# Patient Record
Sex: MALE | Race: BLACK OR AFRICAN AMERICAN | Employment: UNEMPLOYED | URBAN - METROPOLITAN AREA
[De-identification: names, ages, dates, MRNs, and addresses within clinical notes are randomized per-mention and may not be internally consistent; named-entity substitution may affect disease eponyms.]

---

## 2019-07-18 ENCOUNTER — HOSPITAL ENCOUNTER (EMERGENCY)
Facility: CLINIC | Age: 54
Discharge: HOME | End: 2019-07-18
Attending: EMERGENCY MEDICINE | Admitting: EMERGENCY MEDICINE
Payer: COMMERCIAL

## 2019-07-18 VITALS
OXYGEN SATURATION: 98 % | RESPIRATION RATE: 18 BRPM | WEIGHT: 185 LBS | BODY MASS INDEX: 26.48 KG/M2 | SYSTOLIC BLOOD PRESSURE: 149 MMHG | TEMPERATURE: 98 F | HEART RATE: 93 BPM | HEIGHT: 70 IN | DIASTOLIC BLOOD PRESSURE: 82 MMHG

## 2019-07-18 DIAGNOSIS — E87.6 HYPOKALEMIA: ICD-10-CM

## 2019-07-18 DIAGNOSIS — R11.2 NON-INTRACTABLE VOMITING WITH NAUSEA, UNSPECIFIED VOMITING TYPE: ICD-10-CM

## 2019-07-18 LAB
ANION GAP SERPL CALCULATED.3IONS-SCNC: 15 MMOL/L (ref 3–14)
BASOPHILS # BLD AUTO: 0.1 10E9/L (ref 0–0.2)
BASOPHILS NFR BLD AUTO: 0.9 %
BUN SERPL-MCNC: 8 MG/DL (ref 7–30)
CALCIUM SERPL-MCNC: 9.2 MG/DL (ref 8.5–10.1)
CHLORIDE SERPL-SCNC: 99 MMOL/L (ref 94–109)
CO2 SERPL-SCNC: 22 MMOL/L (ref 20–32)
CREAT SERPL-MCNC: 0.46 MG/DL (ref 0.66–1.25)
DIFFERENTIAL METHOD BLD: ABNORMAL
EOSINOPHIL # BLD AUTO: 0.3 10E9/L (ref 0–0.7)
EOSINOPHIL NFR BLD AUTO: 5.6 %
ERYTHROCYTE [DISTWIDTH] IN BLOOD BY AUTOMATED COUNT: 12.8 % (ref 10–15)
GFR SERPL CREATININE-BSD FRML MDRD: >90 ML/MIN/{1.73_M2}
GLUCOSE SERPL-MCNC: 115 MG/DL (ref 70–99)
HCT VFR BLD AUTO: 43 % (ref 40–53)
HGB BLD-MCNC: 14.1 G/DL (ref 13.3–17.7)
IMM GRANULOCYTES # BLD: 0 10E9/L (ref 0–0.4)
IMM GRANULOCYTES NFR BLD: 0.4 %
LYMPHOCYTES # BLD AUTO: 0.9 10E9/L (ref 0.8–5.3)
LYMPHOCYTES NFR BLD AUTO: 16.3 %
MCH RBC QN AUTO: 33.7 PG (ref 26.5–33)
MCHC RBC AUTO-ENTMCNC: 32.8 G/DL (ref 31.5–36.5)
MCV RBC AUTO: 103 FL (ref 78–100)
MONOCYTES # BLD AUTO: 0.5 10E9/L (ref 0–1.3)
MONOCYTES NFR BLD AUTO: 8.7 %
NEUTROPHILS # BLD AUTO: 3.7 10E9/L (ref 1.6–8.3)
NEUTROPHILS NFR BLD AUTO: 68.1 %
NRBC # BLD AUTO: 0 10*3/UL
NRBC BLD AUTO-RTO: 0 /100
PLATELET # BLD AUTO: 131 10E9/L (ref 150–450)
POTASSIUM SERPL-SCNC: 3 MMOL/L (ref 3.4–5.3)
RBC # BLD AUTO: 4.18 10E12/L (ref 4.4–5.9)
SODIUM SERPL-SCNC: 135 MMOL/L (ref 133–144)
WBC # BLD AUTO: 5.4 10E9/L (ref 4–11)

## 2019-07-18 PROCEDURE — 99284 EMERGENCY DEPT VISIT MOD MDM: CPT | Mod: Z6 | Performed by: EMERGENCY MEDICINE

## 2019-07-18 PROCEDURE — 80048 BASIC METABOLIC PNL TOTAL CA: CPT | Performed by: EMERGENCY MEDICINE

## 2019-07-18 PROCEDURE — 96365 THER/PROPH/DIAG IV INF INIT: CPT | Performed by: EMERGENCY MEDICINE

## 2019-07-18 PROCEDURE — 25000131 ZZH RX MED GY IP 250 OP 636 PS 637: Performed by: EMERGENCY MEDICINE

## 2019-07-18 PROCEDURE — 96361 HYDRATE IV INFUSION ADD-ON: CPT | Performed by: EMERGENCY MEDICINE

## 2019-07-18 PROCEDURE — 25000132 ZZH RX MED GY IP 250 OP 250 PS 637: Performed by: EMERGENCY MEDICINE

## 2019-07-18 PROCEDURE — 99284 EMERGENCY DEPT VISIT MOD MDM: CPT | Mod: 25 | Performed by: EMERGENCY MEDICINE

## 2019-07-18 PROCEDURE — 96372 THER/PROPH/DIAG INJ SC/IM: CPT | Mod: 59 | Performed by: EMERGENCY MEDICINE

## 2019-07-18 PROCEDURE — 25000128 H RX IP 250 OP 636: Performed by: EMERGENCY MEDICINE

## 2019-07-18 PROCEDURE — 96366 THER/PROPH/DIAG IV INF ADDON: CPT | Performed by: EMERGENCY MEDICINE

## 2019-07-18 PROCEDURE — 85025 COMPLETE CBC W/AUTO DIFF WBC: CPT | Performed by: EMERGENCY MEDICINE

## 2019-07-18 RX ORDER — ONDANSETRON 4 MG/1
4 TABLET, ORALLY DISINTEGRATING ORAL EVERY 8 HOURS PRN
Qty: 10 TABLET | Refills: 0 | Status: ON HOLD | OUTPATIENT
Start: 2019-07-18 | End: 2019-10-25

## 2019-07-18 RX ORDER — ACETAMINOPHEN 325 MG/1
975 TABLET ORAL ONCE
Status: COMPLETED | OUTPATIENT
Start: 2019-07-18 | End: 2019-07-18

## 2019-07-18 RX ORDER — ONDANSETRON 4 MG/1
4 TABLET, ORALLY DISINTEGRATING ORAL ONCE
Status: COMPLETED | OUTPATIENT
Start: 2019-07-18 | End: 2019-07-18

## 2019-07-18 RX ORDER — POTASSIUM CL/LIDO/0.9 % NACL 10MEQ/0.1L
10 INTRAVENOUS SOLUTION, PIGGYBACK (ML) INTRAVENOUS ONCE
Status: COMPLETED | OUTPATIENT
Start: 2019-07-18 | End: 2019-07-18

## 2019-07-18 RX ORDER — OLANZAPINE 10 MG/2ML
2.5 INJECTION, POWDER, FOR SOLUTION INTRAMUSCULAR ONCE
Status: COMPLETED | OUTPATIENT
Start: 2019-07-18 | End: 2019-07-18

## 2019-07-18 RX ORDER — ONDANSETRON 4 MG/1
4 TABLET, ORALLY DISINTEGRATING ORAL ONCE
Status: DISCONTINUED | OUTPATIENT
Start: 2019-07-18 | End: 2019-07-18

## 2019-07-18 RX ORDER — POTASSIUM CHLORIDE 1.5 G/1.58G
60 POWDER, FOR SOLUTION ORAL ONCE
Status: COMPLETED | OUTPATIENT
Start: 2019-07-18 | End: 2019-07-18

## 2019-07-18 RX ORDER — SODIUM CHLORIDE 9 MG/ML
1000 INJECTION, SOLUTION INTRAVENOUS CONTINUOUS
Status: DISCONTINUED | OUTPATIENT
Start: 2019-07-18 | End: 2019-07-18 | Stop reason: HOSPADM

## 2019-07-18 RX ADMIN — SODIUM CHLORIDE 1000 ML: 900 INJECTION, SOLUTION INTRAVENOUS at 06:50

## 2019-07-18 RX ADMIN — SODIUM CHLORIDE 1000 ML: 9 INJECTION, SOLUTION INTRAVENOUS at 05:11

## 2019-07-18 RX ADMIN — ONDANSETRON 4 MG: 4 TABLET, ORALLY DISINTEGRATING ORAL at 04:52

## 2019-07-18 RX ADMIN — ACETAMINOPHEN 975 MG: 325 TABLET, FILM COATED ORAL at 09:16

## 2019-07-18 RX ADMIN — OLANZAPINE 2.5 MG: 10 INJECTION, POWDER, FOR SOLUTION INTRAMUSCULAR at 06:10

## 2019-07-18 RX ADMIN — POTASSIUM CHLORIDE 60 MEQ: 1.5 POWDER, FOR SOLUTION ORAL at 06:02

## 2019-07-18 RX ADMIN — Medication 10 MEQ: at 06:13

## 2019-07-18 ASSESSMENT — MIFFLIN-ST. JEOR: SCORE: 1690.4

## 2019-07-18 NOTE — DISCHARGE INSTRUCTIONS
"Please make an appointment to follow up with Your Primary Care Provider in 1-2 days unless symptoms completely resolve.  If your symptoms worsen despite taking the prescribed medications please come back to the emergency department.        *VOMITING [6yr-Adult]  Vomiting is a common symptom that may be due to different causes. These include gastroenteritis (\"stomach-flu\"), food poisoning and gastritis. There are other more serious causes of vomiting which may be hard to diagnose early in the illness. Therefore, it is important to watch for the warning signs listed below.  The main danger from repeated vomiting is \"dehydration\". This is due to excess loss of water and minerals from the body. When this occurs, body fluids must be replaced.  HOME CARE:  1) If symptoms are severe, rest at home for the next 24 hours.  2) You may use acetaminophen (Tylenol) 650-1000 mg every 6 hours to control fever, unless another medicine was prescribed. [ NOTE : If you have chronic liver disease, talk with your doctor before using acetaminophen.] (Aspirin should never be used in anyone under 18 years of age who is ill with a fever. It may cause severe liver damage.)  3) Avoid tobacco and alcohol use, which may worsen your symptoms.  4) If medicines for vomiting were prescribed, take as directed.   DURING THE FIRST 12-24 HOURS follow the diet below. Try to take frequent small sips even if you vomit occasionally:  FRUIT JUICES: Apple, grape juice, clear fruit drinks, electrolyte replacement and sports drinks.  BEVERAGES: Sport drinks such as Gatorade, soft drinks without caffeine; mineral water (plain or flavored), decaffeinated tea and coffee.  SOUPS: Clear broth, consommé and bouillon  DESSERTS: Plain gelatin (Jell-O), popsicles and fruit juice bars.  DURING THE NEXT 24 HOURS you may add the following to the above:  Hot cereal, plain toast, bread, rolls, crackers  Plain noodles, rice, mashed potatoes, chicken noodle or rice " soup  Unsweetened canned fruit (avoid pineapple), bananas  Avoid dairy products   Limit caffeine and chocolate. No spices or seasonings except salt.   DURING THE NEXT 24 HOURS  Gradually resume a normal diet, as you feel better and your symptoms lessen.  FOLLOW UP with your doctor as advised if you are not improving over the next 2-3 days.  GET PROMPT MEDICAL ATTENTION if any of the following occur:  -- Constant abdominal pain that stays in the same spot or gets worse  -- Continued vomiting (unable to keep liquids down) for 24 hours  -- Frequent diarrhea (more than 5 times a day); blood (red or black color) in diarrhea  -- No urine output for 12 hours or extreme thirst  -- Weakness, dizziness or fainting  -- Unusually drowsy or confused  -- Fever over 101.0  F (38.3  C) for more than 3 days  -- Yellow color of the eyes or skin    6883-1261 [employer], [address] [place] [address], [place] [address]. All rights reserved. This information is not intended as a substitute for professional medical care. Always follow your healthcare professional's instructions.

## 2019-07-18 NOTE — ED NOTES
Patient signed out to me by my partner.  Patient presented to the emergency department with nausea/vomiting.  Patient found to have hypokalemia and his potassium was repleted with intravenous and oral potassium solutions.  Patient's nausea and vomiting resolved and he asked to be discharged.  Patient denies any pain at discharge.  Gait is normal discharge.     Brooke Oviedo MD  07/18/19 5742

## 2019-07-18 NOTE — ED PROVIDER NOTES
"  History     Chief Complaint   Patient presents with     Nausea & Vomiting     HPI  Bridgette eMna is a 53 year old male history of diabetes, hypertriglyceridemia induced pancreatitis, hepatic steatosis, history of HCV and HIV exposure, asthma, substance abuse, among other medical problems.  Patient was a friend who brought another patient to the emergency department and later was triaged for nausea and vomiting.  Apparently, he was in contact with others who had similar symptoms.  Yesterday he had been feeling completely well towards the evening began to feel nauseous and early this morning had several episodes of vomiting associated with chills, feeling cold, shakes and malaise.    No associated chest pain or shortness of breath.  Endorses some bony aches.  No rashes.  No recent trauma.  Again was feeling completely well yesterday morning.    I have reviewed the Medications, Allergies, Past Medical and Surgical History, and Social History in the Epic system.    Review of Systems   14 point review of symptoms was performed and is negative except as noted above.     Physical Exam   BP: (!) 139/96  Pulse: 120  Temp: 98  F (36.7  C)  Resp: 16  Height: 177.8 cm (5' 10\")  Weight: 83.9 kg (185 lb)  SpO2: 95 %      Physical Exam     GEN: Nauseated   HEENT: The head is normocephalic and atraumatic. Pupils are equal round and reactive to light. Extraocular motions are intact. There is no facial swelling. The neck is nontender and supple.   CV: Regular rate and rhythm without murmurs rubs or gallops. 2+ radial pulses bilaterally.  PULM: Clear to auscultation bilaterally.  ABD: Soft, nontender, nondistended.   EXT: Full range of motion.  No edema.  NEURO: Cranial nerves II through XII are intact and symmetric. Bilateral upper and lower extremities grossly show full range of motion without any focal deficits.   PSYCH: Calm and cooperative, interactive.     ED Course        Procedures               Labs Ordered and Resulted " from Time of ED Arrival Up to the Time of Departure from the ED   CBC WITH PLATELETS DIFFERENTIAL - Abnormal; Notable for the following components:       Result Value    RBC Count 4.18 (*)      (*)     MCH 33.7 (*)     Platelet Count 131 (*)     All other components within normal limits   BASIC METABOLIC PANEL - Abnormal; Notable for the following components:    Potassium 3.0 (*)     Anion Gap 15 (*)     Glucose 115 (*)     Creatinine 0.46 (*)     All other components within normal limits            Assessments & Plan (with Medical Decision Making)   53-year-old male with acute nausea and vomiting and general malaise, Reiger's.  Differential diagnosis is broad including bacteremia, viremia, acute viral syndrome, pneumonia, urinary tract infection, alcohol withdrawal, among other causes.    Clinically the patient appears nauseous.  His exam is otherwise unrevealing.  He has an ill contact with very similar symptoms.  Labs are overall reassuring with exception of mild hypokalemia which was orally and IV replaced.  Patient was given Zofran and olanzapine as well as IV fluids with significant improvement in symptoms.  Patient was signed out to morning attending for reevaluation.  Anticipate she will improve and will be appropriate for discharge following oral challenge.              I have reviewed the nursing notes.    I have reviewed the findings, diagnosis, plan and need for follow up with the patient.       Medication List      There are no discharge medications for this visit.         Final diagnoses:   Non-intractable vomiting with nausea, unspecified vomiting type       7/18/2019   [facility], [place], EMERGENCY DEPARTMENT     Gera Del Castillo MD  07/18/19 0765

## 2019-07-18 NOTE — ED TRIAGE NOTES
Patient presents with c/o nausea and vomiting for the past day. Patient denies abdominal pain and diarrhea. Also reports chills and hot flashes. Denies daily drinking, last drink yesterday morning.

## 2019-07-18 NOTE — ED AVS SNAPSHOT
[facility], [place], Emergency Department  [address] [place] [id number]  Phone:  [phone]                                    Bridgette Mena   MRN: [id number]    Department:  [facility], Emergency Department   Date of Visit:  7/18/2019           After Visit Summary Signature Page    I have received my discharge instructions, and my questions have been answered. I have discussed any challenges I see with this plan with the nurse or doctor.    ..........................................................................................................................................  Patient/Patient Representative Signature      ..........................................................................................................................................  Patient Representative Print Name and Relationship to Patient    ..................................................               ................................................  Date                                   Time    ..........................................................................................................................................  Reviewed by Signature/Title    ...................................................              ..............................................  Date                                               Time          22EPIC Rev 08/18

## 2019-10-20 ENCOUNTER — APPOINTMENT (OUTPATIENT)
Dept: CT IMAGING | Facility: CLINIC | Age: 54
End: 2019-10-20
Attending: EMERGENCY MEDICINE
Payer: COMMERCIAL

## 2019-10-20 ENCOUNTER — APPOINTMENT (OUTPATIENT)
Dept: GENERAL RADIOLOGY | Facility: CLINIC | Age: 54
End: 2019-10-20
Attending: EMERGENCY MEDICINE
Payer: COMMERCIAL

## 2019-10-20 ENCOUNTER — HOSPITAL ENCOUNTER (INPATIENT)
Facility: CLINIC | Age: 54
LOS: 5 days | Discharge: HOME | End: 2019-10-25
Attending: EMERGENCY MEDICINE | Admitting: INTERNAL MEDICINE
Payer: COMMERCIAL

## 2019-10-20 DIAGNOSIS — K92.2 GASTROINTESTINAL HEMORRHAGE, UNSPECIFIED GASTROINTESTINAL HEMORRHAGE TYPE: ICD-10-CM

## 2019-10-20 DIAGNOSIS — J18.9 PNEUMONIA OF RIGHT MIDDLE LOBE DUE TO INFECTIOUS ORGANISM: ICD-10-CM

## 2019-10-20 DIAGNOSIS — A09 DIARRHEA OF INFECTIOUS ORIGIN: Primary | ICD-10-CM

## 2019-10-20 PROBLEM — R19.7 DIARRHEA: Status: ACTIVE | Noted: 2019-10-20

## 2019-10-20 LAB
ABO + RH BLD: NORMAL
ABO + RH BLD: NORMAL
ALBUMIN SERPL-MCNC: 2.7 G/DL (ref 3.4–5)
ALP SERPL-CCNC: 448 U/L (ref 40–150)
ALT SERPL W P-5'-P-CCNC: 45 U/L (ref 0–70)
ANION GAP SERPL CALCULATED.3IONS-SCNC: 17 MMOL/L (ref 3–14)
AST SERPL W P-5'-P-CCNC: 185 U/L (ref 0–45)
BASOPHILS # BLD AUTO: 0 10E9/L (ref 0–0.2)
BASOPHILS NFR BLD AUTO: 0.3 %
BILIRUB SERPL-MCNC: 3.8 MG/DL (ref 0.2–1.3)
BLD GP AB SCN SERPL QL: NORMAL
BLOOD BANK CMNT PATIENT-IMP: NORMAL
BUN SERPL-MCNC: 6 MG/DL (ref 7–30)
CALCIUM SERPL-MCNC: 8.2 MG/DL (ref 8.5–10.1)
CHLORIDE SERPL-SCNC: 98 MMOL/L (ref 94–109)
CO2 BLDCOV-SCNC: 22 MMOL/L (ref 21–28)
CO2 SERPL-SCNC: 21 MMOL/L (ref 20–32)
CREAT SERPL-MCNC: 0.37 MG/DL (ref 0.66–1.25)
DIFFERENTIAL METHOD BLD: ABNORMAL
EOSINOPHIL # BLD AUTO: 0.1 10E9/L (ref 0–0.7)
EOSINOPHIL NFR BLD AUTO: 0.8 %
ERYTHROCYTE [DISTWIDTH] IN BLOOD BY AUTOMATED COUNT: 14 % (ref 10–15)
ETHANOL SERPL-MCNC: 0.24 G/DL
GFR SERPL CREATININE-BSD FRML MDRD: >90 ML/MIN/{1.73_M2}
GLUCOSE BLDC GLUCOMTR-MCNC: 149 MG/DL (ref 70–99)
GLUCOSE BLDC GLUCOMTR-MCNC: 42 MG/DL (ref 70–99)
GLUCOSE BLDC GLUCOMTR-MCNC: 72 MG/DL (ref 70–99)
GLUCOSE SERPL-MCNC: 69 MG/DL (ref 70–99)
HCT VFR BLD AUTO: 34.2 % (ref 40–53)
HGB BLD-MCNC: 10.3 G/DL (ref 13.3–17.7)
HGB BLD-MCNC: 11.2 G/DL (ref 13.3–17.7)
IMM GRANULOCYTES # BLD: 0 10E9/L (ref 0–0.4)
IMM GRANULOCYTES NFR BLD: 0.4 %
INR PPP: 1.37 (ref 0.86–1.14)
LACTATE BLD-SCNC: 1.8 MMOL/L (ref 0.7–2.1)
LIPASE SERPL-CCNC: 87 U/L (ref 73–393)
LYMPHOCYTES # BLD AUTO: 1.1 10E9/L (ref 0.8–5.3)
LYMPHOCYTES NFR BLD AUTO: 12.3 %
MAGNESIUM SERPL-MCNC: 1.3 MG/DL (ref 1.6–2.3)
MCH RBC QN AUTO: 35.6 PG (ref 26.5–33)
MCHC RBC AUTO-ENTMCNC: 32.7 G/DL (ref 31.5–36.5)
MCV RBC AUTO: 109 FL (ref 78–100)
MONOCYTES # BLD AUTO: 0.7 10E9/L (ref 0–1.3)
MONOCYTES NFR BLD AUTO: 7.4 %
NEUTROPHILS # BLD AUTO: 7.3 10E9/L (ref 1.6–8.3)
NEUTROPHILS NFR BLD AUTO: 78.8 %
NRBC # BLD AUTO: 0 10*3/UL
NRBC BLD AUTO-RTO: 0 /100
PCO2 BLDV: 33 MM HG (ref 40–50)
PH BLDV: 7.43 PH (ref 7.32–7.43)
PHOSPHATE SERPL-MCNC: 3.3 MG/DL (ref 2.5–4.5)
PLATELET # BLD AUTO: 124 10E9/L (ref 150–450)
PO2 BLDV: 52 MM HG (ref 25–47)
POTASSIUM SERPL-SCNC: 3.2 MMOL/L (ref 3.4–5.3)
PROT SERPL-MCNC: 8.7 G/DL (ref 6.8–8.8)
RBC # BLD AUTO: 3.15 10E12/L (ref 4.4–5.9)
SAO2 % BLDV FROM PO2: 88 %
SODIUM SERPL-SCNC: 135 MMOL/L (ref 133–144)
SPECIMEN EXP DATE BLD: NORMAL
WBC # BLD AUTO: 9.2 10E9/L (ref 4–11)

## 2019-10-20 PROCEDURE — 25000128 H RX IP 250 OP 636: Performed by: NURSE PRACTITIONER

## 2019-10-20 PROCEDURE — 96365 THER/PROPH/DIAG IV INF INIT: CPT | Mod: 59 | Performed by: EMERGENCY MEDICINE

## 2019-10-20 PROCEDURE — 83735 ASSAY OF MAGNESIUM: CPT | Performed by: EMERGENCY MEDICINE

## 2019-10-20 PROCEDURE — 87040 BLOOD CULTURE FOR BACTERIA: CPT | Performed by: EMERGENCY MEDICINE

## 2019-10-20 PROCEDURE — 87493 C DIFF AMPLIFIED PROBE: CPT | Performed by: NURSE PRACTITIONER

## 2019-10-20 PROCEDURE — 99207 ZZC APP CREDIT; MD BILLING SHARED VISIT: CPT | Performed by: NURSE PRACTITIONER

## 2019-10-20 PROCEDURE — 80053 COMPREHEN METABOLIC PANEL: CPT | Performed by: EMERGENCY MEDICINE

## 2019-10-20 PROCEDURE — 81001 URINALYSIS AUTO W/SCOPE: CPT | Performed by: NURSE PRACTITIONER

## 2019-10-20 PROCEDURE — 25000132 ZZH RX MED GY IP 250 OP 250 PS 637: Performed by: EMERGENCY MEDICINE

## 2019-10-20 PROCEDURE — 84100 ASSAY OF PHOSPHORUS: CPT | Performed by: EMERGENCY MEDICINE

## 2019-10-20 PROCEDURE — 86900 BLOOD TYPING SEROLOGIC ABO: CPT | Performed by: EMERGENCY MEDICINE

## 2019-10-20 PROCEDURE — 99223 1ST HOSP IP/OBS HIGH 75: CPT | Mod: AI | Performed by: INTERNAL MEDICINE

## 2019-10-20 PROCEDURE — 96376 TX/PRO/DX INJ SAME DRUG ADON: CPT | Performed by: EMERGENCY MEDICINE

## 2019-10-20 PROCEDURE — 25000128 H RX IP 250 OP 636: Performed by: STUDENT IN AN ORGANIZED HEALTH CARE EDUCATION/TRAINING PROGRAM

## 2019-10-20 PROCEDURE — 96375 TX/PRO/DX INJ NEW DRUG ADDON: CPT | Performed by: EMERGENCY MEDICINE

## 2019-10-20 PROCEDURE — 96368 THER/DIAG CONCURRENT INF: CPT | Performed by: EMERGENCY MEDICINE

## 2019-10-20 PROCEDURE — 99285 EMERGENCY DEPT VISIT HI MDM: CPT | Mod: 25 | Performed by: EMERGENCY MEDICINE

## 2019-10-20 PROCEDURE — 25000132 ZZH RX MED GY IP 250 OP 250 PS 637: Performed by: NURSE PRACTITIONER

## 2019-10-20 PROCEDURE — 25000125 ZZHC RX 250: Performed by: EMERGENCY MEDICINE

## 2019-10-20 PROCEDURE — 70450 CT HEAD/BRAIN W/O DYE: CPT

## 2019-10-20 PROCEDURE — 25000128 H RX IP 250 OP 636: Performed by: EMERGENCY MEDICINE

## 2019-10-20 PROCEDURE — 83605 ASSAY OF LACTIC ACID: CPT

## 2019-10-20 PROCEDURE — 80320 DRUG SCREEN QUANTALCOHOLS: CPT | Performed by: EMERGENCY MEDICINE

## 2019-10-20 PROCEDURE — C9113 INJ PANTOPRAZOLE SODIUM, VIA: HCPCS | Performed by: NURSE PRACTITIONER

## 2019-10-20 PROCEDURE — 83690 ASSAY OF LIPASE: CPT | Performed by: EMERGENCY MEDICINE

## 2019-10-20 PROCEDURE — 25800030 ZZH RX IP 258 OP 636: Performed by: EMERGENCY MEDICINE

## 2019-10-20 PROCEDURE — 25800025 ZZH RX 258: Performed by: NURSE PRACTITIONER

## 2019-10-20 PROCEDURE — 12000001 ZZH R&B MED SURG/OB UMMC

## 2019-10-20 PROCEDURE — C9113 INJ PANTOPRAZOLE SODIUM, VIA: HCPCS | Performed by: EMERGENCY MEDICINE

## 2019-10-20 PROCEDURE — 74177 CT ABD & PELVIS W/CONTRAST: CPT

## 2019-10-20 PROCEDURE — 87329 GIARDIA AG IA: CPT | Performed by: NURSE PRACTITIONER

## 2019-10-20 PROCEDURE — 85018 HEMOGLOBIN: CPT | Performed by: NURSE PRACTITIONER

## 2019-10-20 PROCEDURE — 96366 THER/PROPH/DIAG IV INF ADDON: CPT | Performed by: EMERGENCY MEDICINE

## 2019-10-20 PROCEDURE — 93010 ELECTROCARDIOGRAM REPORT: CPT | Mod: Z6 | Performed by: EMERGENCY MEDICINE

## 2019-10-20 PROCEDURE — 85025 COMPLETE CBC W/AUTO DIFF WBC: CPT | Performed by: EMERGENCY MEDICINE

## 2019-10-20 PROCEDURE — 80320 DRUG SCREEN QUANTALCOHOLS: CPT | Performed by: NURSE PRACTITIONER

## 2019-10-20 PROCEDURE — 80307 DRUG TEST PRSMV CHEM ANLYZR: CPT | Performed by: NURSE PRACTITIONER

## 2019-10-20 PROCEDURE — 82803 BLOOD GASES ANY COMBINATION: CPT

## 2019-10-20 PROCEDURE — 86850 RBC ANTIBODY SCREEN: CPT | Performed by: EMERGENCY MEDICINE

## 2019-10-20 PROCEDURE — 87506 IADNA-DNA/RNA PROBE TQ 6-11: CPT | Performed by: NURSE PRACTITIONER

## 2019-10-20 PROCEDURE — 71046 X-RAY EXAM CHEST 2 VIEWS: CPT

## 2019-10-20 PROCEDURE — 87328 CRYPTOSPORIDIUM AG IA: CPT | Performed by: NURSE PRACTITIONER

## 2019-10-20 PROCEDURE — 00000146 ZZHCL STATISTIC GLUCOSE BY METER IP

## 2019-10-20 PROCEDURE — 25800030 ZZH RX IP 258 OP 636: Performed by: NURSE PRACTITIONER

## 2019-10-20 PROCEDURE — 86901 BLOOD TYPING SEROLOGIC RH(D): CPT | Performed by: EMERGENCY MEDICINE

## 2019-10-20 PROCEDURE — 85610 PROTHROMBIN TIME: CPT | Performed by: EMERGENCY MEDICINE

## 2019-10-20 PROCEDURE — 93005 ELECTROCARDIOGRAM TRACING: CPT | Performed by: EMERGENCY MEDICINE

## 2019-10-20 PROCEDURE — 87522 HEPATITIS C REVRS TRNSCRPJ: CPT | Performed by: EMERGENCY MEDICINE

## 2019-10-20 RX ORDER — MIRTAZAPINE 30 MG/1
30 TABLET, FILM COATED ORAL
COMMUNITY
Start: 2019-10-19

## 2019-10-20 RX ORDER — GABAPENTIN 300 MG/1
300 CAPSULE ORAL
COMMUNITY
Start: 2019-09-18

## 2019-10-20 RX ORDER — ALBUTEROL SULFATE 90 UG/1
1-2 AEROSOL, METERED RESPIRATORY (INHALATION) EVERY 4 HOURS PRN
Status: DISCONTINUED | OUTPATIENT
Start: 2019-10-20 | End: 2019-10-25

## 2019-10-20 RX ORDER — FOLIC ACID 1 MG/1
1 TABLET ORAL
COMMUNITY
Start: 2019-06-24

## 2019-10-20 RX ORDER — PROCHLORPERAZINE 25 MG
25 SUPPOSITORY, RECTAL RECTAL EVERY 12 HOURS PRN
Status: DISCONTINUED | OUTPATIENT
Start: 2019-10-20 | End: 2019-10-25

## 2019-10-20 RX ORDER — IOPAMIDOL 755 MG/ML
100 INJECTION, SOLUTION INTRAVASCULAR ONCE
Status: COMPLETED | OUTPATIENT
Start: 2019-10-20 | End: 2019-10-20

## 2019-10-20 RX ORDER — OXYCODONE HYDROCHLORIDE 5 MG/1
5 TABLET ORAL EVERY 4 HOURS PRN
Status: DISCONTINUED | OUTPATIENT
Start: 2019-10-20 | End: 2019-10-25

## 2019-10-20 RX ORDER — LORATADINE 10 MG/1
10 TABLET ORAL
COMMUNITY
Start: 2019-10-19

## 2019-10-20 RX ORDER — NALOXONE HYDROCHLORIDE 0.4 MG/ML
.1-.4 INJECTION, SOLUTION INTRAMUSCULAR; INTRAVENOUS; SUBCUTANEOUS
Status: DISCONTINUED | OUTPATIENT
Start: 2019-10-20 | End: 2019-10-25

## 2019-10-20 RX ORDER — FLUTICASONE PROPIONATE 50 MCG
1 SPRAY, SUSPENSION (ML) NASAL
COMMUNITY
Start: 2016-02-10

## 2019-10-20 RX ORDER — FOLIC ACID 1 MG/1
1 TABLET ORAL DAILY
Status: DISCONTINUED | OUTPATIENT
Start: 2019-10-21 | End: 2019-10-20

## 2019-10-20 RX ORDER — MAGNESIUM SULFATE HEPTAHYDRATE 40 MG/ML
4 INJECTION, SOLUTION INTRAVENOUS EVERY 4 HOURS PRN
Status: DISCONTINUED | OUTPATIENT
Start: 2019-10-20 | End: 2019-10-25

## 2019-10-20 RX ORDER — ONDANSETRON 2 MG/ML
4 INJECTION INTRAMUSCULAR; INTRAVENOUS EVERY 6 HOURS PRN
Status: DISCONTINUED | OUTPATIENT
Start: 2019-10-20 | End: 2019-10-25

## 2019-10-20 RX ORDER — CHLORDIAZEPOXIDE HYDROCHLORIDE 25 MG/1
25 CAPSULE, GELATIN COATED ORAL 3 TIMES DAILY
Status: DISCONTINUED | OUTPATIENT
Start: 2019-10-20 | End: 2019-10-20

## 2019-10-20 RX ORDER — DIAZEPAM 5 MG
10 TABLET ORAL EVERY 30 MIN PRN
Status: DISCONTINUED | OUTPATIENT
Start: 2019-10-20 | End: 2019-10-20 | Stop reason: CLARIF

## 2019-10-20 RX ORDER — LANOLIN ALCOHOL/MO/W.PET/CERES
100 CREAM (GRAM) TOPICAL DAILY
Status: DISCONTINUED | OUTPATIENT
Start: 2019-10-20 | End: 2019-10-20

## 2019-10-20 RX ORDER — ESCITALOPRAM OXALATE 5 MG/1
10 TABLET ORAL DAILY
Status: DISCONTINUED | OUTPATIENT
Start: 2019-10-21 | End: 2019-10-25

## 2019-10-20 RX ORDER — LANOLIN ALCOHOL/MO/W.PET/CERES
100 CREAM (GRAM) TOPICAL DAILY
Status: DISCONTINUED | OUTPATIENT
Start: 2019-10-21 | End: 2019-10-25 | Stop reason: HOSPADM

## 2019-10-20 RX ORDER — QUETIAPINE FUMARATE 300 MG/1
600 TABLET, FILM COATED ORAL AT BEDTIME
Status: DISCONTINUED | OUTPATIENT
Start: 2019-10-20 | End: 2019-10-25

## 2019-10-20 RX ORDER — QUETIAPINE FUMARATE 300 MG/1
600 TABLET, FILM COATED ORAL
COMMUNITY
Start: 2019-09-19

## 2019-10-20 RX ORDER — AMPICILLIN AND SULBACTAM 2; 1 G/1; G/1
3 INJECTION, POWDER, FOR SOLUTION INTRAMUSCULAR; INTRAVENOUS ONCE
Status: COMPLETED | OUTPATIENT
Start: 2019-10-20 | End: 2019-10-20

## 2019-10-20 RX ORDER — AMPICILLIN AND SULBACTAM 2; 1 G/1; G/1
3 INJECTION, POWDER, FOR SOLUTION INTRAMUSCULAR; INTRAVENOUS EVERY 6 HOURS
Status: DISCONTINUED | OUTPATIENT
Start: 2019-10-21 | End: 2019-10-21

## 2019-10-20 RX ORDER — MIRTAZAPINE 15 MG/1
30 TABLET, FILM COATED ORAL AT BEDTIME
Status: DISCONTINUED | OUTPATIENT
Start: 2019-10-20 | End: 2019-10-25

## 2019-10-20 RX ORDER — FOLIC ACID 1 MG/1
1 TABLET ORAL DAILY
Status: DISCONTINUED | OUTPATIENT
Start: 2019-10-20 | End: 2019-10-25

## 2019-10-20 RX ORDER — ONDANSETRON 4 MG/1
4 TABLET, ORALLY DISINTEGRATING ORAL EVERY 6 HOURS PRN
Status: DISCONTINUED | OUTPATIENT
Start: 2019-10-20 | End: 2019-10-25

## 2019-10-20 RX ORDER — MULTIPLE VITAMINS W/ MINERALS TAB 9MG-400MCG
1 TAB ORAL DAILY
Status: DISCONTINUED | OUTPATIENT
Start: 2019-10-20 | End: 2019-10-25

## 2019-10-20 RX ORDER — OCTREOTIDE ACETATE 50 UG/ML
50 INJECTION, SOLUTION INTRAVENOUS; SUBCUTANEOUS ONCE
Status: COMPLETED | OUTPATIENT
Start: 2019-10-20 | End: 2019-10-20

## 2019-10-20 RX ORDER — LIDOCAINE 40 MG/G
CREAM TOPICAL
Status: DISCONTINUED | OUTPATIENT
Start: 2019-10-20 | End: 2019-10-25

## 2019-10-20 RX ORDER — FLUTICASONE PROPIONATE 110 UG/1
2 AEROSOL, METERED RESPIRATORY (INHALATION)
COMMUNITY
Start: 2019-07-19

## 2019-10-20 RX ORDER — GABAPENTIN 300 MG/1
300 CAPSULE ORAL AT BEDTIME
Status: DISCONTINUED | OUTPATIENT
Start: 2019-10-20 | End: 2019-10-25

## 2019-10-20 RX ORDER — ONDANSETRON 2 MG/ML
4 INJECTION INTRAMUSCULAR; INTRAVENOUS EVERY 30 MIN PRN
Status: DISCONTINUED | OUTPATIENT
Start: 2019-10-20 | End: 2019-10-20 | Stop reason: CLARIF

## 2019-10-20 RX ORDER — PANTOPRAZOLE SODIUM 40 MG/1
40 TABLET, DELAYED RELEASE ORAL
COMMUNITY
Start: 2019-06-24

## 2019-10-20 RX ORDER — POTASSIUM CHLORIDE 20MEQ/15ML
20 LIQUID (ML) ORAL ONCE
Status: COMPLETED | OUTPATIENT
Start: 2019-10-20 | End: 2019-10-20

## 2019-10-20 RX ORDER — ESCITALOPRAM OXALATE 10 MG/1
10 TABLET ORAL
COMMUNITY
Start: 2019-10-19

## 2019-10-20 RX ORDER — DEXTROSE MONOHYDRATE 25 G/50ML
25-50 INJECTION, SOLUTION INTRAVENOUS
Status: DISCONTINUED | OUTPATIENT
Start: 2019-10-20 | End: 2019-10-25

## 2019-10-20 RX ORDER — ERGOCALCIFEROL 1.25 MG/1
50000 CAPSULE, LIQUID FILLED ORAL
COMMUNITY
Start: 2019-10-19

## 2019-10-20 RX ORDER — PROCHLORPERAZINE MALEATE 10 MG
10 TABLET ORAL EVERY 6 HOURS PRN
Status: DISCONTINUED | OUTPATIENT
Start: 2019-10-20 | End: 2019-10-25

## 2019-10-20 RX ORDER — MULTIPLE VITAMINS W/ MINERALS TAB 9MG-400MCG
1 TAB ORAL DAILY
Status: DISCONTINUED | OUTPATIENT
Start: 2019-10-21 | End: 2019-10-20

## 2019-10-20 RX ORDER — CALCIUM CARBONATE 500 MG/1
500 TABLET, CHEWABLE ORAL
COMMUNITY
Start: 2019-05-10

## 2019-10-20 RX ORDER — NICOTINE POLACRILEX 4 MG
15-30 LOZENGE BUCCAL
Status: DISCONTINUED | OUTPATIENT
Start: 2019-10-20 | End: 2019-10-25

## 2019-10-20 RX ORDER — LORAZEPAM 1 MG/1
1-2 TABLET ORAL EVERY 30 MIN PRN
Status: DISCONTINUED | OUTPATIENT
Start: 2019-10-20 | End: 2019-10-25

## 2019-10-20 RX ORDER — ALBUTEROL SULFATE 90 UG/1
1-2 AEROSOL, METERED RESPIRATORY (INHALATION)
COMMUNITY
Start: 2016-05-12

## 2019-10-20 RX ADMIN — POTASSIUM CHLORIDE 20 MEQ: 20 SOLUTION ORAL at 17:35

## 2019-10-20 RX ADMIN — PANTOPRAZOLE SODIUM 8 MG/HR: 40 INJECTION, POWDER, FOR SOLUTION INTRAVENOUS at 20:55

## 2019-10-20 RX ADMIN — OCTREOTIDE ACETATE 50 MCG: 50 INJECTION, SOLUTION INTRAVENOUS; SUBCUTANEOUS at 20:10

## 2019-10-20 RX ADMIN — THIAMINE HCL TAB 100 MG 100 MG: 100 TAB at 17:33

## 2019-10-20 RX ADMIN — Medication 2 G: at 17:37

## 2019-10-20 RX ADMIN — GABAPENTIN 300 MG: 300 CAPSULE ORAL at 23:37

## 2019-10-20 RX ADMIN — OCTREOTIDE ACETATE 50 MCG/HR: 200 INJECTION, SOLUTION INTRAVENOUS; SUBCUTANEOUS at 20:12

## 2019-10-20 RX ADMIN — MULTIPLE VITAMINS W/ MINERALS TAB 1 TABLET: TAB at 17:33

## 2019-10-20 RX ADMIN — DEXTROSE 50 % IN WATER (D50W) INTRAVENOUS SYRINGE 25 ML: at 22:40

## 2019-10-20 RX ADMIN — LORAZEPAM 1 MG: 1 TABLET ORAL at 23:45

## 2019-10-20 RX ADMIN — SODIUM CHLORIDE 1000 ML: 9 INJECTION, SOLUTION INTRAVENOUS at 17:29

## 2019-10-20 RX ADMIN — IOPAMIDOL 100 ML: 755 INJECTION, SOLUTION INTRAVENOUS at 17:16

## 2019-10-20 RX ADMIN — PANTOPRAZOLE SODIUM 80 MG: 40 INJECTION, POWDER, LYOPHILIZED, FOR SOLUTION INTRAVENOUS at 20:24

## 2019-10-20 RX ADMIN — MIRTAZAPINE 30 MG: 15 TABLET, FILM COATED ORAL at 23:37

## 2019-10-20 RX ADMIN — AMPICILLIN SODIUM AND SULBACTAM SODIUM 3 G: 2; 1 INJECTION, POWDER, FOR SOLUTION INTRAMUSCULAR; INTRAVENOUS at 20:55

## 2019-10-20 RX ADMIN — ONDANSETRON 4 MG: 2 INJECTION INTRAMUSCULAR; INTRAVENOUS at 17:31

## 2019-10-20 RX ADMIN — FOLIC ACID 1 MG: 1 TABLET ORAL at 17:33

## 2019-10-20 ASSESSMENT — ENCOUNTER SYMPTOMS
FEVER: 0
DYSURIA: 0
CONFUSION: 0
FATIGUE: 1
DIZZINESS: 1
APPETITE CHANGE: 1
WEAKNESS: 1
VOMITING: 1
SHORTNESS OF BREATH: 1
ABDOMINAL PAIN: 1
ABDOMINAL DISTENTION: 1
CONSTIPATION: 0
DIARRHEA: 1
BLOOD IN STOOL: 1
COUGH: 1
HEADACHES: 0
NAUSEA: 1

## 2019-10-20 ASSESSMENT — MIFFLIN-ST. JEOR: SCORE: 1586.52

## 2019-10-20 NOTE — ED TRIAGE NOTES
Pt reports N/V/D  Progressively getting worse since 9/30. Pt originally thought his 'bad feeling' was from the many sweets he ate during his birthday celebration. Pt reports progressive weakness over the same time frame and falling from dizziness. Today Pt ate some rice with whole milk and a 'shot of Vodka.' Pt does have ETOH breath.

## 2019-10-20 NOTE — H&P
[facility], [place]    History and Physical - Hospitalist Service, Gold Night       Date of Admission:  10/20/2019    Assessment & Plan   Bridgette Mena is a 54 year old male admitted on 10/20/2019. He has a history of DM, asthma, seizures, polysubstance abuse and ongoing alcohol use (patient denies but BAL .24 on admission) who presents with bloody diarrhea and hepatitis.    1) Bloody diarrhea - Presents with loose, oily stools mised with dark red blood ongoing for two weeks.  ~10 episodes daily with no associated fevers.  Notes significant associated bloating.  He has a history of alcohol hepatitis and ongoing drinking (patient denies but BAL elevated and he has a history or alcohol withdrawal).  Question infectious diarrhea (less likely) vs GI bleed.  - C diff, SSCE, giardia  - Protonix, octreotide, GI consult for potential bleed.  NPO for possible scope  - Will check hgb tonight and then again in am.  - Given unclear extent of liver disease will treat with Unasyn to cover possible SBP (trace/small amount of ascites per my review of his CT scan) as well as concern for pneumonia on CT scan.    2) Alcoholic hepatitis, alcohol abuse - Patient with long history of polysubstance abuse who is not forthcoming regarding current alcohol use.  AST/ALT consistent with alcoholic hepatitis.  He had an extensive work up at [facility] on 5/10/2019 admission with negative anti smooth, PAULA < 1:80, negative Hep B, positive Hep C antibody but undetectable hep C via PCR.  - GI consulted as above  - CMP in am  - Given multiple prior episodes of alcohol withdrawal and an unclear alcohol history (patient reports last drink 10/17 but BAL 0.24) will start on librium for likely alcohol withdrawal.    3) Question of PNA - Patchy ground glass opacities in RML.  Given history of polysubstance abuse, aspiration is a concern.  - Unasyn as above    4) History of DM II  - Continue home lantus 18 units QHS  - QID blood  sugars    5) History of asthma  - Continue home flovent, albuterol     Diet: NPO pending EGD  DVT Prophylaxis: Pneumatic Compression Devices  Garcia Catheter: not present  Code Status: Full    Disposition Plan   Expected discharge: 4 - 7 days, recommended to prior living arrangement once diagnosis established.  Entered: ESTEPHANIE Giang CNP 10/20/2019, 6:49 PM     The patient's care was discussed with the Attending Physician, Dr. Jenkins.    ESTEPHANIE Giang CNP  [facility], [place]  Pager: [phone]  Please see sticky note for cross cover information  ______________________________________________________________________    Chief Complaint   Found down by neighbor and brought in    History is obtained from the patient    History of Present Illness   Bridgette Mena is a 54 year old male admitted on 10/20/2019. He has a history of DM, asthma, seizures, polysubstance abuse and ongoing alcohol use (patient denies but BAL .24 on admission) who presents with bloody diarrhea and hepatitis.    The patient provides a limited history.  Some of his accounts clash with prior records I have available.  I do not consider him an entirely reliable historian, particularly as regards his substance abuse and alcohol use.    The patient reports he has been ill for two weeks with oily diarrhea with small amounts of dark red blood.  He is having ~10 stools daily.  He has some nausea as well as vomiting with green emesis.  He has not noted any coffee ground emesis.  He has not had any associated fevers or chills.  He denies sick contacts.  He does have diffuse abdominal tenderness as well as a bloating sensation.    The patient reports he last drank on 10/17.  Today he says he tried to drink a small amount of vodka but could not (BAL 0.24 on admission).  He adamantly denies any regular drinking or withdrawal symptoms, although a review of his chart shows prior withdrawals with prolonged  hospitalizations.    The patient was found down in the door of his apartment today and brought in to the hospital.    Review of Systems    The 10 point Review of Systems is negative other than noted in the HPI or here.     Past Medical History    I have reviewed this patient's medical history and updated it with pertinent information if needed.   Past Medical History:   Diagnosis Date     Alcohol abuse     Denies but BAL elevated     Diabetes (H)      Pancreatitis      Polysubstance abuse (H)        Past Surgical History   I have reviewed this patient's surgical history and updated it with pertinent information if needed.  History reviewed. No pertinent surgical history.    Social History   I have reviewed this patient's social history and updated it with pertinent information if needed.  Social History     Tobacco Use     Smoking status: Current Every Day Smoker     Packs/day: 0.00     Types: Cigarettes   Substance Use Topics     Alcohol use: Yes     Drug use: None       Family History   I have reviewed this patient's family history and updated it with pertinent information if needed.   Family History   Problem Relation Age of Onset     Heart Disease Mother      Heart Disease Father      Prior to Admission Medications   Prior to Admission Medications   Prescriptions Last Dose Informant Patient Reported? Taking?   QUEtiapine (SEROQUEL) 300 MG tablet   Yes Yes   Sig: Take 600 mg by mouth   albuterol (PROAIR HFA/PROVENTIL HFA/VENTOLIN HFA) 108 (90 Base) MCG/ACT inhaler   Yes Yes   Sig: Inhale 1-2 puffs into the lungs   amylase-lipase-protease (CREON 12) 07039 units CPEP   Yes Yes   Sig: Take 36,000 Units by mouth   calcium carbonate (TUMS) 500 MG chewable tablet   Yes Yes   Sig: Take 500 mg by mouth   escitalopram (LEXAPRO) 10 MG tablet   Yes Yes   Sig: Take 10 mg by mouth   fluticasone (FLONASE) 50 MCG/ACT nasal spray   Yes Yes   Sig: Spray 1 spray in nostril   fluticasone (FLOVENT HFA) 110 MCG/ACT inhaler   Yes Yes    Sig: Inhale 2 puffs into the lungs   folic acid (FOLVITE) 1 MG tablet   Yes Yes   Sig: Take 1 mg by mouth   gabapentin (NEURONTIN) 300 MG capsule   Yes Yes   Sig: Take 300 mg by mouth   insulin glargine (LANTUS PEN) 100 UNIT/ML pen   Yes Yes   Sig: Inject 18 Units Subcutaneous   loratadine (CLARITIN) 10 MG tablet   Yes Yes   Sig: Take 10 mg by mouth   magnesium oxide (MAG-OX) 400 (241.3 Mg) MG tablet   Yes Yes   Sig: Take 400 mg by mouth   mirtazapine (REMERON) 30 MG tablet   Yes Yes   Sig: Take 30 mg by mouth   ondansetron (ZOFRAN ODT) 4 MG ODT tab   No No   Sig: Take 1 tablet (4 mg) by mouth every 8 hours as needed for nausea   pantoprazole (PROTONIX) 40 MG EC tablet   Yes Yes   Sig: Take 40 mg by mouth   ranitidine (ZANTAC) 150 MG tablet   Yes Yes   Sig: Take 150 mg by mouth   vitamin D2 (ERGOCALCIFEROL) 75802 units (1250 mcg) capsule   Yes Yes   Sig: Take 50,000 Units by mouth      Facility-Administered Medications: None     Allergies   Allergies   Allergen Reactions     Mushrooms [Mushroom] Rash       Physical Exam   Vital Signs: Temp: 97.1  F (36.2  C) Temp src: Oral BP: 123/87   Heart Rate: 121 Resp: 16 SpO2: 92 % O2 Device: None (Room air)    Weight: 163 lbs 3.2 oz    Physical Exam   Constitutional:   Well nourished, well developed, resting comfortably   Head: Normocephalic and atraumatic.   Eyes: Conjunctivae are normal. Pupils are equal, round, and reactive to light.  Pharynx has no erythema or exudate, mucous membranes are moist  Neck:   No adenopathy, no bony tenderness  Cardiovascular: Regular rate and rhythm without murmurs or gallops  Pulmonary/Chest: Clear to auscultation bilaterally, with no wheezes or retractions. No respiratory distress.  GI: Soft with good bowel sounds.   Mildly distended, diffusely tender with no guarding, no rebound, no peritoneal signs.   Back:  No bony or CVA tenderness   Musculoskeletal:  No edema or clubbing   Skin: Skin is warm and dry. No rash noted.   Neurological:  Alert and oriented to person, place, and time. Nonfocal exam  Psychiatric:  Normal mood and affect.      Data   Data reviewed today: I reviewed all medications, new labs and imaging results over the last 24 hours. I personally reviewed his labs and imaging from today.

## 2019-10-20 NOTE — ED PROVIDER NOTES
"     [place] EMERGENCY DEPARTMENT ([facility])  10/20/19  History     Chief Complaint   Patient presents with     Nausea, Vomiting, & Diarrhea     Dizziness since 9/30     The history is provided by the patient and medical records.     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  Patient states that he has been feeling increasingly nauseous.  Patient also states that he has been using the toilet for bowel movement 8 or 9 times a day.  Patient states that his BMs are bloody and black, soft.  Patient discovered these bloody stools on Monday (10/14/2019) but thinks he may have been having them for a while without paying attention to the color of his stools. He reports that he has been feeling bad since his birthday when he had some alcoholic beverages and a lot of sweets. Prior to today, last drink was on Thursday. Today, he was feeling progressively week and dizzy and his friend found him on the floor in his home between two doorways. He thinks he lost consciousness and hit his head. However, he does not recall having a seizure or fainting. Denies headache or neck pain currently. Denies h/o ETOH withdrawal. States he took a shot of vodka later on today, after this fall. He took the vodka to try to \"calm down his stomach.\" Patient states that he has had worsening diffuse abdominal pain and new distention. On ROS, patient also reports having a productive cough and sob. Denies fever, cp, confusion.    Past Medical History:   Diagnosis Date     Diabetes (H)        History reviewed. No pertinent surgical history.    No family history on file.    Social History     Tobacco Use     Smoking " "status: Not on file   Substance Use Topics     Alcohol use: Not on file       Current Facility-Administered Medications   Medication     ampicillin-sulbactam (UNASYN) 3 g vial to attach to  mL bag     diazepam (VALIUM) tablet 10 mg     folic acid (FOLVITE) tablet 1 mg     multivitamin w/minerals (THERA-VIT-M) tablet 1 tablet     octreotide (sandoSTATIN) 1,250 mcg in sodium chloride 0.9 % 250 mL     octreotide (sandoSTATIN) injection 50 mcg     ondansetron (ZOFRAN) injection 4 mg     pantoprazole (PROTONIX) 40 mg IV push injection     pantoprazole (PROTONIX) 80 mg in sodium chloride 0.9 % 100 mL infusion     vitamin B1 (THIAMINE) tablet 100 mg     No current outpatient medications on file.        Allergies   Allergen Reactions     Mushrooms [Mushroom] Rash     I have reviewed the Medications, Allergies, Past Medical and Surgical History, and Social History in the Epic system.    Review of Systems   Constitutional: Positive for appetite change and fatigue. Negative for fever.   Respiratory: Positive for cough and shortness of breath.    Cardiovascular: Negative for chest pain.   Gastrointestinal: Positive for abdominal distention, abdominal pain, blood in stool, diarrhea, nausea and vomiting. Negative for constipation.   Genitourinary: Negative for dysuria.   Skin: Positive for rash.   Neurological: Positive for dizziness, syncope and weakness. Negative for headaches.   Psychiatric/Behavioral: Negative for confusion.   All other systems reviewed and are negative.      Physical Exam   BP: 123/87  Heart Rate: 121  Temp: 97.1  F (36.2  C)  Resp: 16  Height: 177.8 cm (5' 10\")  Weight: 74 kg (163 lb 3.2 oz)  SpO2: 92 %      Physical Exam  Vitals signs and nursing note reviewed. Exam conducted with a chaperone present.   Constitutional:       General: He is not in acute distress.     Appearance: He is well-developed. He is ill-appearing. He is not toxic-appearing or diaphoretic.   HENT:      Head: Normocephalic and " atraumatic.      Nose: Nose normal.      Mouth/Throat:      Mouth: Mucous membranes are dry.   Eyes:      General: Scleral icterus present.      Conjunctiva/sclera: Conjunctivae normal.   Neck:      Musculoskeletal: Normal range of motion and neck supple. No neck rigidity.   Cardiovascular:      Rate and Rhythm: Regular rhythm. Tachycardia present.      Heart sounds: Normal heart sounds. No murmur.   Pulmonary:      Effort: Pulmonary effort is normal. No respiratory distress.      Breath sounds: No stridor. Rhonchi present. No wheezing or rales.   Abdominal:      General: Abdomen is protuberant. Bowel sounds are normal. There is distension.      Palpations: There is no mass.      Tenderness: There is generalized tenderness. There is guarding. There is no rebound.   Genitourinary:     Rectum: Guaiac result positive. Tenderness present. No mass, anal fissure, external hemorrhoid or internal hemorrhoid. Normal anal tone.      Comments: Gross melena. No BRB or clots. Guaiac positive.   Musculoskeletal: Normal range of motion.         General: No deformity or signs of injury.   Skin:     General: Skin is warm and dry.      Findings: No bruising or rash.   Neurological:      General: No focal deficit present.      Mental Status: He is alert and oriented to person, place, and time.   Psychiatric:         Speech: Speech is tangential.         Behavior: Behavior normal.         Thought Content: Thought content normal.         ED Course   5:06 PM  The patient was seen and examined by Teresa Forrester MD in Room ED07.        Procedures             EKG Interpretation:      Interpreted by Teresa Forrester MD  Time reviewed: 5:09  Symptoms at time of EKG: abdominal pain   Rhythm: sinus tachycardia  Rate: Tachycardia and 110-120  Axis: Left Axis Deviation  Ectopy: none  Conduction: normal  ST Segments/ T Waves: No ST-T wave changes  Q Waves: none  Comparison to prior: no significant changes    Clinical Impression: sinus  tachycardia                Critical Care time:  none             Labs Ordered and Resulted from Time of ED Arrival Up to the Time of Departure from the ED   CBC WITH PLATELETS DIFFERENTIAL - Abnormal; Notable for the following components:       Result Value    RBC Count 3.15 (*)     Hemoglobin 11.2 (*)     Hematocrit 34.2 (*)      (*)     MCH 35.6 (*)     Platelet Count 124 (*)     All other components within normal limits   COMPREHENSIVE METABOLIC PANEL - Abnormal; Notable for the following components:    Potassium 3.2 (*)     Anion Gap 17 (*)     Glucose 69 (*)     Urea Nitrogen 6 (*)     Creatinine 0.37 (*)     Calcium 8.2 (*)     Bilirubin Total 3.8 (*)     Albumin 2.7 (*)     Alkaline Phosphatase 448 (*)      (*)     All other components within normal limits   ALCOHOL ETHYL - Abnormal; Notable for the following components:    Ethanol g/dL 0.24 (*)     All other components within normal limits   MAGNESIUM - Abnormal; Notable for the following components:    Magnesium 1.3 (*)     All other components within normal limits   INR - Abnormal; Notable for the following components:    INR 1.37 (*)     All other components within normal limits   ISTAT  GASES LACTATE RILEY POCT - Abnormal; Notable for the following components:    PCO2 Venous 33 (*)     PO2 Venous 52 (*)     All other components within normal limits   LIPASE   PHOSPHORUS   ROUTINE UA WITH MICROSCOPIC REFLEX TO CULTURE   DRUG ABUSE SCREEN 6 CHEM DEP URINE ([facility])   HEPATITIS C RNA QUANTITATIVE   CBC WITH PLATELETS   ISTAT CG4 GASES LACTATE RILEY NURSING POCT   CIWA-AR SCALE AND VS   ASSESS SUICIDALITY   NOTIFY PROVIDER   NOTIFY PROVIDER   ABO/RH TYPE AND SCREEN   ENTERIC BACTERIA AND VIRUS PANEL BY SANDRA STOOL   BLOOD CULTURE   BLOOD CULTURE     MELD-Na score: 17 at 10/20/2019  4:05 PM  MELD score: 15 at 10/20/2019  4:05 PM  Calculated from:  Serum Creatinine: 0.37 mg/dL (Rounded to 1 mg/dL) at 10/20/2019  4:05 PM  Serum Sodium: 135 mmol/L at  10/20/2019  4:05 PM  Total Bilirubin: 3.8 mg/dL at 10/20/2019  4:05 PM  INR(ratio): 1.37 at 10/20/2019  4:05 PM  Age: 54 years         Assessments & Plan (with Medical Decision Making)   Bridgette Mena is a 54 year old male with a past medical history of DMII, alcohol abuse, chronic alcoholic pancreatitis, hepatic steatosis, hep C, 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 Incidentally reports black and grossly bloody stools.     Ddx: ICH, etoh withdrawal seizure, alcoholic hepatitis/pancreatitis/gastritis, acute alcoholic cirrhosis with ascites, SBP, hepatic encephalopathy, variceal bleed, PUD    Presenting with subacute symptoms of abdominal pain, nausea, vomiting in the setting of likely heavy alcohol use.  Also with fall and LOC today.  This happened 3 days after his last drink making alcohol withdrawal seizure a distinct possibility.  Though patient denies a history of known withdrawal seizures in the past.  Also denies consistent drinking.  Per review of care everywhere charts from other hospitals he has a documented history of alcohol abuse and elevated liver enzymes, hepatic steatosis, and chronic pancreatitis which are all felt to be related to alcohol use.   Breath alcohol 0.24 on arrival.  Patient somewhat tangential.  Denies headache/neck pain.  Will obtain head CT given unwitnessed fall with loss of consciousness.    Patient's abdomen is distended and concerning for ascites/SBP/acute cirrhosis.  Diffusely tender with guarding.  Will obtain CT abdomen and pelvis.    Labs also consistent with chronic alcohol abuse including hypomagnesemia, hyperbilirubinemia, transaminitis, hypokalemia, thrombocytopenia, and elevated INR.    Pt incidentally reports new melena and blood in his stools.  He denies blood in his emesis.  Unclear  of the duration of the symptoms.  Patient's hemoglobin 11.2 from 14.1 on last check in July.  Guaiac is positive with gross melena on exam. No bright red blood or clots.  Patient is mildly tachycardic with a sinus rhythm.  This may be due to a mild component of alcohol withdrawal versus acute hemorrhage versus hypovolemia.  We will continue to monitor closely and recheck hemoglobin.    Empirically started on Protonix bolus and drip.  Per review of records, there is no documentation that the patient has known esophageal, rectal, gastric varices.  However, given patient's constellation of findings, he is a high risk GI bleed patient and will need admission for close monitoring.      Admitted to medicine on telemetry.     CT abdomen and pelvis shows hepatomegaly with heterogeneous liver attenuation.  No mention of ascites or acute bowel pathology.  Patchy groundglass opacities in the right middle lobe.  Patient treated for aspiration pneumonia with Unasyn..  CT head within normal limits.    I discussed the above findings with GI fellow.  He recommended empiric treatment with octreotide bolus and drip as well as ceftriaxone.  He is okay with Unasyn, in lieu of ceftriaxone, given coexisting treatment for aspiration PNA.  He requests patient be n.p.o. at midnight for likely upper endoscopy in the morning.    I have reviewed the nursing notes.    I have reviewed the findings, diagnosis, plan and need for follow up with the patient.    New Prescriptions    No medications on file       Final diagnoses:   Gastrointestinal hemorrhage, unspecified gastrointestinal hemorrhage type   Pneumonia of right middle lobe due to infectious organism (H)   Shahbaz ACEVEDO, am serving as a trained medical scribe to document services personally performed by Teresa Forrester MD, based on the provider's statements to me.      Teresa ACEVEDO MD, was physically present and have reviewed and verified the accuracy of this note documented by  Shahbaz Barros.    10/20/2019   [facility], [place], EMERGENCY DEPARTMENT     Teresa Forrester MD  10/20/19 2009

## 2019-10-21 LAB
ALBUMIN SERPL-MCNC: 2.2 G/DL (ref 3.4–5)
ALBUMIN SERPL-MCNC: 2.2 G/DL (ref 3.4–5)
ALBUMIN UR-MCNC: 30 MG/DL
ALP SERPL-CCNC: 351 U/L (ref 40–150)
ALP SERPL-CCNC: 356 U/L (ref 40–150)
ALT SERPL W P-5'-P-CCNC: 33 U/L (ref 0–70)
ALT SERPL W P-5'-P-CCNC: 37 U/L (ref 0–70)
AMMONIA PLAS-SCNC: 89 UMOL/L (ref 10–50)
AMPHETAMINES UR QL SCN: NEGATIVE
ANION GAP SERPL CALCULATED.3IONS-SCNC: 16 MMOL/L (ref 3–14)
ANION GAP SERPL CALCULATED.3IONS-SCNC: 18 MMOL/L (ref 3–14)
APPEARANCE UR: CLEAR
AST SERPL W P-5'-P-CCNC: 150 U/L (ref 0–45)
AST SERPL W P-5'-P-CCNC: 151 U/L (ref 0–45)
BARBITURATES UR QL: NEGATIVE
BASE DEFICIT BLDV-SCNC: 6.8 MMOL/L
BASOPHILS # BLD AUTO: 0 10E9/L (ref 0–0.2)
BASOPHILS NFR BLD AUTO: 0.4 %
BENZODIAZ UR QL: NEGATIVE
BILIRUB DIRECT SERPL-MCNC: 2 MG/DL (ref 0–0.2)
BILIRUB SERPL-MCNC: 2.9 MG/DL (ref 0.2–1.3)
BILIRUB SERPL-MCNC: 3.4 MG/DL (ref 0.2–1.3)
BILIRUB UR QL STRIP: ABNORMAL
BUN SERPL-MCNC: 4 MG/DL (ref 7–30)
BUN SERPL-MCNC: 5 MG/DL (ref 7–30)
C COLI+JEJUNI+LARI FUSA STL QL NAA+PROBE: NOT DETECTED
C DIFF TOX B STL QL: POSITIVE
C PARVUM AG STL QL IA: NEGATIVE
CALCIUM SERPL-MCNC: 7.6 MG/DL (ref 8.5–10.1)
CALCIUM SERPL-MCNC: 7.7 MG/DL (ref 8.5–10.1)
CANNABINOIDS UR QL SCN: NEGATIVE
CHLORIDE SERPL-SCNC: 100 MMOL/L (ref 94–109)
CHLORIDE SERPL-SCNC: 101 MMOL/L (ref 94–109)
CO2 SERPL-SCNC: 16 MMOL/L (ref 20–32)
CO2 SERPL-SCNC: 19 MMOL/L (ref 20–32)
COCAINE UR QL: NEGATIVE
COLOR UR AUTO: ABNORMAL
CREAT SERPL-MCNC: 0.39 MG/DL (ref 0.66–1.25)
CREAT SERPL-MCNC: 0.41 MG/DL (ref 0.66–1.25)
DIFFERENTIAL METHOD BLD: ABNORMAL
EC STX1 GENE STL QL NAA+PROBE: NOT DETECTED
EC STX2 GENE STL QL NAA+PROBE: NOT DETECTED
ENTERIC PATHOGEN COMMENT: NORMAL
EOSINOPHIL # BLD AUTO: 0.1 10E9/L (ref 0–0.7)
EOSINOPHIL NFR BLD AUTO: 0.9 %
ERYTHROCYTE [DISTWIDTH] IN BLOOD BY AUTOMATED COUNT: 14.2 % (ref 10–15)
ETHANOL UR QL SCN: POSITIVE
G LAMBLIA AG STL QL IA: NEGATIVE
GFR SERPL CREATININE-BSD FRML MDRD: >90 ML/MIN/{1.73_M2}
GFR SERPL CREATININE-BSD FRML MDRD: >90 ML/MIN/{1.73_M2}
GLUCOSE BLDC GLUCOMTR-MCNC: 137 MG/DL (ref 70–99)
GLUCOSE BLDC GLUCOMTR-MCNC: 164 MG/DL (ref 70–99)
GLUCOSE BLDC GLUCOMTR-MCNC: 237 MG/DL (ref 70–99)
GLUCOSE BLDC GLUCOMTR-MCNC: 94 MG/DL (ref 70–99)
GLUCOSE SERPL-MCNC: 166 MG/DL (ref 70–99)
GLUCOSE SERPL-MCNC: 72 MG/DL (ref 70–99)
GLUCOSE UR STRIP-MCNC: NEGATIVE MG/DL
HCO3 BLDV-SCNC: 18 MMOL/L (ref 21–28)
HCT VFR BLD AUTO: 27.1 % (ref 40–53)
HCT VFR BLD AUTO: 28 % (ref 40–53)
HCT VFR BLD AUTO: 28.3 % (ref 40–53)
HCT VFR BLD AUTO: 29 % (ref 40–53)
HGB BLD-MCNC: 8.5 G/DL (ref 13.3–17.7)
HGB BLD-MCNC: 8.7 G/DL (ref 13.3–17.7)
HGB BLD-MCNC: 8.9 G/DL (ref 13.3–17.7)
HGB BLD-MCNC: 8.9 G/DL (ref 13.3–17.7)
HGB UR QL STRIP: ABNORMAL
IMM GRANULOCYTES # BLD: 0 10E9/L (ref 0–0.4)
IMM GRANULOCYTES NFR BLD: 0.4 %
INTERPRETATION ECG - MUSE: NORMAL
INTERPRETATION ECG - MUSE: NORMAL
KETONES BLD-SCNC: 7.6 MMOL/L (ref 0–0.6)
KETONES UR STRIP-MCNC: 40 MG/DL
LACTATE BLD-SCNC: 1.3 MMOL/L (ref 0.7–2)
LACTATE BLD-SCNC: 1.5 MMOL/L (ref 0.7–2)
LEUKOCYTE ESTERASE UR QL STRIP: NEGATIVE
LYMPHOCYTES # BLD AUTO: 0.8 10E9/L (ref 0.8–5.3)
LYMPHOCYTES NFR BLD AUTO: 14.7 %
MAGNESIUM SERPL-MCNC: 1.6 MG/DL (ref 1.6–2.3)
MAGNESIUM SERPL-MCNC: 1.8 MG/DL (ref 1.6–2.3)
MCH RBC QN AUTO: 35.5 PG (ref 26.5–33)
MCH RBC QN AUTO: 35.5 PG (ref 26.5–33)
MCH RBC QN AUTO: 35.9 PG (ref 26.5–33)
MCH RBC QN AUTO: 36 PG (ref 26.5–33)
MCHC RBC AUTO-ENTMCNC: 30.7 G/DL (ref 31.5–36.5)
MCHC RBC AUTO-ENTMCNC: 30.7 G/DL (ref 31.5–36.5)
MCHC RBC AUTO-ENTMCNC: 31.4 G/DL (ref 31.5–36.5)
MCHC RBC AUTO-ENTMCNC: 31.8 G/DL (ref 31.5–36.5)
MCV RBC AUTO: 110 FL (ref 78–100)
MCV RBC AUTO: 113 FL (ref 78–100)
MCV RBC AUTO: 114 FL (ref 78–100)
MCV RBC AUTO: 116 FL (ref 78–100)
MONOCYTES # BLD AUTO: 0.6 10E9/L (ref 0–1.3)
MONOCYTES NFR BLD AUTO: 10 %
MUCOUS THREADS #/AREA URNS LPF: PRESENT /LPF
NEUTROPHILS # BLD AUTO: 4.1 10E9/L (ref 1.6–8.3)
NEUTROPHILS NFR BLD AUTO: 73.6 %
NITRATE UR QL: NEGATIVE
NOROV GI+II ORF1-ORF2 JNC STL QL NAA+PR: NOT DETECTED
NRBC # BLD AUTO: 0 10*3/UL
NRBC BLD AUTO-RTO: 0 /100
O2/TOTAL GAS SETTING VFR VENT: 21 %
OPIATES UR QL SCN: NEGATIVE
PCO2 BLDV: 32 MM HG (ref 40–50)
PH BLDV: 7.36 PH (ref 7.32–7.43)
PH UR STRIP: 6.5 PH (ref 5–7)
PHOSPHATE SERPL-MCNC: 3 MG/DL (ref 2.5–4.5)
PHOSPHATE SERPL-MCNC: 3.3 MG/DL (ref 2.5–4.5)
PLATELET # BLD AUTO: 81 10E9/L (ref 150–450)
PLATELET # BLD AUTO: 82 10E9/L (ref 150–450)
PLATELET # BLD AUTO: 87 10E9/L (ref 150–450)
PLATELET # BLD AUTO: 91 10E9/L (ref 150–450)
PO2 BLDV: 74 MM HG (ref 25–47)
POTASSIUM SERPL-SCNC: 4.1 MMOL/L (ref 3.4–5.3)
POTASSIUM SERPL-SCNC: 4.1 MMOL/L (ref 3.4–5.3)
PROT SERPL-MCNC: 7.1 G/DL (ref 6.8–8.8)
PROT SERPL-MCNC: 7.1 G/DL (ref 6.8–8.8)
RBC # BLD AUTO: 2.37 10E12/L (ref 4.4–5.9)
RBC # BLD AUTO: 2.45 10E12/L (ref 4.4–5.9)
RBC # BLD AUTO: 2.47 10E12/L (ref 4.4–5.9)
RBC # BLD AUTO: 2.51 10E12/L (ref 4.4–5.9)
RBC #/AREA URNS AUTO: <1 /HPF (ref 0–2)
RVA NSP5 STL QL NAA+PROBE: NOT DETECTED
SALMONELLA SP RPOD STL QL NAA+PROBE: NOT DETECTED
SHIGELLA SP+EIEC IPAH STL QL NAA+PROBE: NOT DETECTED
SODIUM SERPL-SCNC: 134 MMOL/L (ref 133–144)
SODIUM SERPL-SCNC: 136 MMOL/L (ref 133–144)
SOURCE: ABNORMAL
SP GR UR STRIP: 1.01 (ref 1–1.03)
SPECIMEN SOURCE: ABNORMAL
SPECIMEN SOURCE: NORMAL
SQUAMOUS #/AREA URNS AUTO: <1 /HPF (ref 0–1)
TROPONIN I SERPL-MCNC: <0.015 UG/L (ref 0–0.04)
UPPER GI ENDOSCOPY: NORMAL
UROBILINOGEN UR STRIP-MCNC: 4 MG/DL (ref 0–2)
V CHOL+PARA RFBL+TRKH+TNAA STL QL NAA+PR: NOT DETECTED
WBC # BLD AUTO: 4.6 10E9/L (ref 4–11)
WBC # BLD AUTO: 5.6 10E9/L (ref 4–11)
WBC # BLD AUTO: 5.8 10E9/L (ref 4–11)
WBC # BLD AUTO: 6.4 10E9/L (ref 4–11)
WBC #/AREA URNS AUTO: 0 /HPF (ref 0–5)
Y ENTERO RECN STL QL NAA+PROBE: NOT DETECTED

## 2019-10-21 PROCEDURE — 82140 ASSAY OF AMMONIA: CPT | Performed by: INTERNAL MEDICINE

## 2019-10-21 PROCEDURE — 93005 ELECTROCARDIOGRAM TRACING: CPT

## 2019-10-21 PROCEDURE — 80076 HEPATIC FUNCTION PANEL: CPT | Performed by: HOSPITALIST

## 2019-10-21 PROCEDURE — G0500 MOD SEDAT ENDO SERVICE >5YRS: HCPCS | Performed by: INTERNAL MEDICINE

## 2019-10-21 PROCEDURE — 25000128 H RX IP 250 OP 636: Performed by: INTERNAL MEDICINE

## 2019-10-21 PROCEDURE — 25800030 ZZH RX IP 258 OP 636: Performed by: NURSE PRACTITIONER

## 2019-10-21 PROCEDURE — C9113 INJ PANTOPRAZOLE SODIUM, VIA: HCPCS | Performed by: NURSE PRACTITIONER

## 2019-10-21 PROCEDURE — 12000001 ZZH R&B MED SURG/OB UMMC

## 2019-10-21 PROCEDURE — 0DJ08ZZ INSPECTION OF UPPER INTESTINAL TRACT, VIA NATURAL OR ARTIFICIAL OPENING ENDOSCOPIC: ICD-10-PCS | Performed by: INTERNAL MEDICINE

## 2019-10-21 PROCEDURE — 36415 COLL VENOUS BLD VENIPUNCTURE: CPT | Performed by: INTERNAL MEDICINE

## 2019-10-21 PROCEDURE — 80053 COMPREHEN METABOLIC PANEL: CPT | Performed by: INTERNAL MEDICINE

## 2019-10-21 PROCEDURE — 84484 ASSAY OF TROPONIN QUANT: CPT | Performed by: HOSPITALIST

## 2019-10-21 PROCEDURE — 25000125 ZZHC RX 250: Performed by: HOSPITALIST

## 2019-10-21 PROCEDURE — 83605 ASSAY OF LACTIC ACID: CPT

## 2019-10-21 PROCEDURE — 99233 SBSQ HOSP IP/OBS HIGH 50: CPT | Performed by: INTERNAL MEDICINE

## 2019-10-21 PROCEDURE — 25800030 ZZH RX IP 258 OP 636: Performed by: HOSPITALIST

## 2019-10-21 PROCEDURE — 25000125 ZZHC RX 250: Performed by: INTERNAL MEDICINE

## 2019-10-21 PROCEDURE — 85025 COMPLETE CBC W/AUTO DIFF WBC: CPT | Performed by: HOSPITALIST

## 2019-10-21 PROCEDURE — 36415 COLL VENOUS BLD VENIPUNCTURE: CPT | Performed by: HOSPITALIST

## 2019-10-21 PROCEDURE — 82803 BLOOD GASES ANY COMBINATION: CPT | Performed by: HOSPITALIST

## 2019-10-21 PROCEDURE — 25000128 H RX IP 250 OP 636: Performed by: NURSE PRACTITIONER

## 2019-10-21 PROCEDURE — 93010 ELECTROCARDIOGRAM REPORT: CPT | Mod: 76 | Performed by: INTERNAL MEDICINE

## 2019-10-21 PROCEDURE — 25000132 ZZH RX MED GY IP 250 OP 250 PS 637: Performed by: HOSPITALIST

## 2019-10-21 PROCEDURE — 00000146 ZZHCL STATISTIC GLUCOSE BY METER IP

## 2019-10-21 PROCEDURE — 80048 BASIC METABOLIC PNL TOTAL CA: CPT | Performed by: HOSPITALIST

## 2019-10-21 PROCEDURE — 85027 COMPLETE CBC AUTOMATED: CPT | Performed by: INTERNAL MEDICINE

## 2019-10-21 PROCEDURE — 83735 ASSAY OF MAGNESIUM: CPT | Performed by: INTERNAL MEDICINE

## 2019-10-21 PROCEDURE — 84100 ASSAY OF PHOSPHORUS: CPT | Performed by: HOSPITALIST

## 2019-10-21 PROCEDURE — 25000128 H RX IP 250 OP 636: Performed by: HOSPITALIST

## 2019-10-21 PROCEDURE — 83735 ASSAY OF MAGNESIUM: CPT | Performed by: HOSPITALIST

## 2019-10-21 PROCEDURE — 82010 KETONE BODYS QUAN: CPT | Performed by: HOSPITALIST

## 2019-10-21 PROCEDURE — P9041 ALBUMIN (HUMAN),5%, 50ML: HCPCS | Performed by: INTERNAL MEDICINE

## 2019-10-21 PROCEDURE — 25000132 ZZH RX MED GY IP 250 OP 250 PS 637: Performed by: NURSE PRACTITIONER

## 2019-10-21 PROCEDURE — 25800025 ZZH RX 258: Performed by: HOSPITALIST

## 2019-10-21 PROCEDURE — 43235 EGD DIAGNOSTIC BRUSH WASH: CPT | Performed by: INTERNAL MEDICINE

## 2019-10-21 PROCEDURE — 83605 ASSAY OF LACTIC ACID: CPT | Performed by: INTERNAL MEDICINE

## 2019-10-21 PROCEDURE — 85027 COMPLETE CBC AUTOMATED: CPT | Performed by: NURSE PRACTITIONER

## 2019-10-21 PROCEDURE — 40000556 ZZH STATISTIC PERIPHERAL IV START W US GUIDANCE

## 2019-10-21 PROCEDURE — 84100 ASSAY OF PHOSPHORUS: CPT | Performed by: INTERNAL MEDICINE

## 2019-10-21 RX ORDER — FENTANYL CITRATE 50 UG/ML
INJECTION, SOLUTION INTRAMUSCULAR; INTRAVENOUS PRN
Status: DISCONTINUED | OUTPATIENT
Start: 2019-10-21 | End: 2019-10-21 | Stop reason: HOSPADM

## 2019-10-21 RX ORDER — THIAMINE HYDROCHLORIDE 100 MG/ML
100 INJECTION, SOLUTION INTRAMUSCULAR; INTRAVENOUS ONCE
Status: COMPLETED | OUTPATIENT
Start: 2019-10-21 | End: 2019-10-21

## 2019-10-21 RX ORDER — ALBUMIN, HUMAN INJ 5% 5 %
25 SOLUTION INTRAVENOUS ONCE
Status: COMPLETED | OUTPATIENT
Start: 2019-10-21 | End: 2019-10-21

## 2019-10-21 RX ORDER — DEXTROSE MONOHYDRATE, SODIUM CHLORIDE, SODIUM LACTATE, POTASSIUM CHLORIDE, CALCIUM CHLORIDE 5; 600; 310; 179; 20 G/100ML; MG/100ML; MG/100ML; MG/100ML; MG/100ML
INJECTION, SOLUTION INTRAVENOUS CONTINUOUS
Status: DISCONTINUED | OUTPATIENT
Start: 2019-10-21 | End: 2019-10-23

## 2019-10-21 RX ORDER — VANCOMYCIN HYDROCHLORIDE 50 MG/ML
125 KIT ORAL 4 TIMES DAILY
Status: DISCONTINUED | OUTPATIENT
Start: 2019-10-21 | End: 2019-10-25

## 2019-10-21 RX ORDER — DIPHENHYDRAMINE HYDROCHLORIDE 50 MG/ML
INJECTION INTRAMUSCULAR; INTRAVENOUS PRN
Status: DISCONTINUED | OUTPATIENT
Start: 2019-10-21 | End: 2019-10-21 | Stop reason: HOSPADM

## 2019-10-21 RX ORDER — ACETAMINOPHEN 325 MG/1
650 TABLET ORAL EVERY 8 HOURS PRN
Status: DISCONTINUED | OUTPATIENT
Start: 2019-10-21 | End: 2019-10-25

## 2019-10-21 RX ADMIN — METRONIDAZOLE 500 MG: 500 INJECTION, SOLUTION INTRAVENOUS at 17:29

## 2019-10-21 RX ADMIN — DEXTROSE MONOHYDRATE, SODIUM CHLORIDE, SODIUM LACTATE, POTASSIUM CHLORIDE, CALCIUM CHLORIDE: 5; 600; 310; 179; 20 INJECTION, SOLUTION INTRAVENOUS at 17:04

## 2019-10-21 RX ADMIN — PANTOPRAZOLE SODIUM 8 MG/HR: 40 INJECTION, POWDER, FOR SOLUTION INTRAVENOUS at 07:16

## 2019-10-21 RX ADMIN — QUETIAPINE 600 MG: 300 TABLET, FILM COATED ORAL at 00:40

## 2019-10-21 RX ADMIN — GABAPENTIN 300 MG: 300 CAPSULE ORAL at 21:37

## 2019-10-21 RX ADMIN — PANCRELIPASE 36000 UNITS: 60000; 12000; 38000 CAPSULE, DELAYED RELEASE PELLETS ORAL at 19:18

## 2019-10-21 RX ADMIN — MIRTAZAPINE 30 MG: 15 TABLET, FILM COATED ORAL at 21:37

## 2019-10-21 RX ADMIN — METRONIDAZOLE 500 MG: 500 INJECTION, SOLUTION INTRAVENOUS at 12:05

## 2019-10-21 RX ADMIN — AMPICILLIN AND SULBACTAM 3 G: 2; 1 INJECTION, POWDER, FOR SOLUTION INTRAMUSCULAR; INTRAVENOUS at 02:54

## 2019-10-21 RX ADMIN — OCTREOTIDE ACETATE 50 MCG/HR: 200 INJECTION, SOLUTION INTRAVENOUS; SUBCUTANEOUS at 23:41

## 2019-10-21 RX ADMIN — Medication 2 G: at 06:52

## 2019-10-21 RX ADMIN — VANCOMYCIN HYDROCHLORIDE 125 MG: KIT at 19:18

## 2019-10-21 RX ADMIN — METRONIDAZOLE 500 MG: 500 INJECTION, SOLUTION INTRAVENOUS at 22:33

## 2019-10-21 RX ADMIN — AMPICILLIN AND SULBACTAM 3 G: 2; 1 INJECTION, POWDER, FOR SOLUTION INTRAMUSCULAR; INTRAVENOUS at 09:25

## 2019-10-21 RX ADMIN — QUETIAPINE 600 MG: 300 TABLET, FILM COATED ORAL at 21:36

## 2019-10-21 RX ADMIN — SODIUM CHLORIDE, POTASSIUM CHLORIDE, SODIUM LACTATE AND CALCIUM CHLORIDE 500 ML: 600; 310; 30; 20 INJECTION, SOLUTION INTRAVENOUS at 04:11

## 2019-10-21 RX ADMIN — POTASSIUM CHLORIDE: 2 INJECTION, SOLUTION, CONCENTRATE INTRAVENOUS at 07:37

## 2019-10-21 RX ADMIN — ACETAMINOPHEN 650 MG: 325 TABLET, FILM COATED ORAL at 22:33

## 2019-10-21 RX ADMIN — THIAMINE HYDROCHLORIDE 100 MG: 100 INJECTION, SOLUTION INTRAMUSCULAR; INTRAVENOUS at 07:48

## 2019-10-21 RX ADMIN — ALBUMIN HUMAN 25 G: 0.05 INJECTION, SOLUTION INTRAVENOUS at 14:07

## 2019-10-21 RX ADMIN — PANTOPRAZOLE SODIUM 8 MG/HR: 40 INJECTION, POWDER, FOR SOLUTION INTRAVENOUS at 18:30

## 2019-10-21 ASSESSMENT — ACTIVITIES OF DAILY LIVING (ADL)
ADLS_ACUITY_SCORE: 19
ADLS_ACUITY_SCORE: 18
ADLS_ACUITY_SCORE: 16
ADLS_ACUITY_SCORE: 18
ADLS_ACUITY_SCORE: 18

## 2019-10-21 ASSESSMENT — MIFFLIN-ST. JEOR: SCORE: 1634.15

## 2019-10-21 NOTE — PLAN OF CARE
O x4 and increasingly lethargic. Pt tachycardic and tachypneic on RA but otherwise vitally stable. MD paged about tachypnea and tachycardia; EKG ordered, 500mL LR bolus ordered and given. Pt triggered sepsis protocol at 0600. MD paged. Lactic of 1.3. Pt reported headache this shift. CIWA protocol initiated; scores of 10, 3, and 3 this shift. 1 mg ativan given for score of 10. Pt stool culture C.Diff positive. MD paged; PO vanco ordered. Pt was too lethargic to take first PO dose. Octreotide gtt running at 10mL/hr. Protonix gtt running at 10mL/hr. Mag replacement running at 50mL/hr. Pt NPO. MD paged for critical ketone 7.6. Pt being considered for transfer to higher level of care due to critical labs and uncontrolled tachycardia. Continue to monitor and follow plan of care.

## 2019-10-21 NOTE — PROGRESS NOTES
[facility] Progress Note - Hospitalist Service, Gold 9       Date of Admission:  10/20/2019  Assessment & Plan   Bridgette Mena is a 54 year old male admitted on 10/20/2019. He has a history of DM, asthma, seizures, polysubstance abuse and ongoing alcohol use (patient denies but BAL .24 on admission) who presents with bloody diarrhea and hepatitis.     # Hematochezia and Melena   # Cdiff diarrhea   # Esophagitis on CT  Presents with loose, oily stools mised with dark red blood ongoing for two weeks.  ~10 episodes daily with no associated fevers.  Notes significant associated bloating.  He has a history of alcohol hepatitis and ongoing drinking (patient denies but BAL elevated and he has a history or alcohol withdrawal).   - C diff positive, on PO vanc but not awake enough to take, IV Flagyl added   - Cont Protonix, octreotide for potential bleed UGI bleed.  NPO for possible scope  - Hgb Q12H  - GI consulted, likely EGD today      # Alcoholic hepatitis,   # Alcohol abuse/dependence  Patient with long history of polysubstance abuse who is not forthcoming regarding current alcohol use.  AST/ALT consistent with alcoholic hepatitis.  He had an extensive work up at [facility] on 5/10/2019 admission with negative anti smooth, PAULA < 1:80, negative Hep B, positive Hep C antibody but undetectable hep C via PCR.  - GI consulted as above  - IVF, CIWA  - CD consult when awake enough      # Question of PNA  - Patchy GGO on abd CT, unclear if cough, afebrile and without leukocytosis. Could be aspiration pneumonitis  - Will monitor off abx     # History of DM II  # Hypoglycemia   - Will hold off home lantus 18 units QHS  - QID blood sugars  - Currently on D5     # History of asthma  - Continue home flovent, albuterol    # Bipolar d/o  - Cont home Seroquel, Remeron   - Unclear if taking Lexapro    # Somnolence  - Likely 2/2 acute illness, medications and EtOH use. CTH negative   - Will  check ammonia level, UDS and monitor   - Hold gabapentin       Diet: NPO per Anesthesia Guidelines for Procedure/Surgery Except for: Meds    DVT Prophylaxis: Pneumatic Compression Devices  Garcia Catheter: not present  Code Status: Full Code      Disposition Plan   Expected discharge: 4 - 7 days, recommended to prior living arrangement once antibiotic plan established, hemoglobin stable and mental status at baseline.  Entered: Brandon Rosales MD 10/21/2019, 12:14 PM       The patient's care was discussed with the Bedside Nurse and Care Coordinator/.    Brandon Rosales MD  Hospitalist Service, [address], [place]  Pager: [phone]  Please see sticky note for cross cover information  ______________________________________________________________________    Interval History   Patient somnolent but arousable and agitated but quickly falls asleep, denies pain but not able to give any other hx     Data reviewed today: I reviewed all medications, new labs and imaging results over the last 24 hours. I personally reviewed the EKG tracing showing sinus tachycardia .    Physical Exam   Vital Signs: Temp: 98.1  F (36.7  C) Temp src: Axillary BP: 129/81 Pulse: 82 Heart Rate: 134 Resp: 24 SpO2: 96 % O2 Device: Nasal cannula Oxygen Delivery: 2 LPM  Weight: 163 lbs 3.2 oz  Constitutional: Somnolent but arousable, no apparent distress  Respiratory: CTAB, anteriorly   Cardiovascular: Tachycardic but regular, no edema   GI: Slightly distended, mild TTP, soft   Skin/Integumen: No rashes, no cyanosis        Data   Recent Labs   Lab 10/21/19  0617 10/21/19  0312 10/20/19  2049 10/20/19  1605   WBC 5.6 6.4  --  9.2   HGB 8.7* 8.9* 10.3* 11.2*   * 110*  --  109*   PLT 81* 91*  --  124*   INR  --   --   --  1.37*   NA  --  134  --  135   POTASSIUM  --  4.1  --  3.2*   CHLORIDE  --  100  --  98   CO2  --  16*  --  21   BUN  --  5*  --  6*   CR  --  0.41*  --  0.37*   ANIONGAP  --  18*  --   17*   NATALIE  --  7.7*  --  8.2*   GLC  --  72  --  69*   ALBUMIN 2.2*  --   --  2.7*   PROTTOTAL 7.1  --   --  8.7   BILITOTAL 2.9*  --   --  3.8*   ALKPHOS 356*  --   --  448*   ALT 33  --   --  45   *  --   --  185*   LIPASE  --   --   --  87   TROPI <0.015  --   --   --

## 2019-10-21 NOTE — PROCEDURES
[facility]ology End[facility] Brief Operative Note    Procedure:  Upper endoscopy   Post-operative diagnosis:  small esophageal varices, esophagitis vs Mallery Saunders tear, gastric varices extending along the fundus (GOV type 2), portal hypertensive gastropathy   Staff Physician:  Esequiel   Fellow/Assistant(s):  PRABHU    Specimens:  Please see final procedure note for further details.   Findings:  small esophageal varices, small erosion at GE junction- healing MW tear vs esophagitis, gastric varices that were not bleeding and had no stigmata were extending along the fundus (type 2 GOV), portal hypertensive gastropathy, normal duodenum. No bleeding during today's exam.   Complications:  None.   Condition:  Stable   Recommendations  Diet:  Return to previous diet  PPI:  PPI po BID  Anti-coagulants/platelets:  N/A  Octreotide:  Stop octreotide drip  Discharge Planning:   PPI BID x4 weeks  Strict alcohol cessation

## 2019-10-21 NOTE — CONSULTS
GASTROENTEROLOGY CONSULTATION      Date of Admission: 10/20/2019       Date of Consult: 10/21/19          Chief Complaint:   We were asked by Eliecer Jenkins MD to evaluate this patient with possible GI bleed.          ASSESSMENT AND RECOMMENDATIONS:   Assessment:  Bridgette Mena is a 54 year old male with a history of DM type II, hyperlipidemia, asthma, seizures, polysubstance abuse (alcohol, marijuana, cocaine, and tobacco use), Hep C viral infection, hx of pancreatitis, pancreatic insufficiency on Creon, splenic vein thrombosis in 2018, depression, and now admitted for hematochezia and hepatitis.     #Melena   #Macrocytic anemia   #Thrombocytopenia   #Esophagitis noted on CT  Patient is somnolent and unable to remember if he has odynophagia and dysphagia. However, he recalls having nausea and vomiting without hematemesis.  HANS revealed melenic stool mixed with red blood.  Baseline hgb is between 14-15, and now down to 8.7 (/RDW 14.2), plts 81 and INR 1.37. Abd/pelvic CT from 10/20/19 showed circumferential esophageal wall thickening.     Likely source of patient's melena is esophagitis s/t alcohol abuse.  GI bleed s/t Ivette-Saunders tear, esophageal varices, PUD, or gastritis cannot be ruled out.  Plan is to continue with supportive care, monitor patient's hemoglobin closely with as needed transfusion, and endoscopic evaluations if patient is willing to proceed with procedure.    #Hematochezia and abdominal pain   #C. Diff infection  Patient is complaining of diffuse abdominal pain and bloating.  Last colonoscopy from 2017 showed 4mm polyps in rectum, descending and transverse colon. Small non bleeding internal hemorrhoid. Enteric pathogens are negative.  No colitis noted on CT from 10/20/19 but stool studies came back positive with C. Difficile.  Oral vancomycin has been ordered but patient has not been able to take anything orally due to sleepiness.    Recommendations  --NPO for possible EGD    --Continue with IV PPI drip  --125mg of oral Vancomycin QID x10 days  --Consider IV Flagyl if patient remains somnolent   --Monitor CBC and transfuse as needed  --Accurate documentation of output (color, characteristics and amount)  --Aspiration precautions  --Will transfer to liver team to manage patient's hepatitis     Gastroenterology follow up recommendations: TBD      Patient care plan discussed with Dr. Iraheta, GI staff physician. Thank you for involving us in this patient's care. Please do not hesitate to contact the GI service with any questions or concerns.     Katherine Chu CNP  Department of Gastroenterology   -------------------------------------------------------------------------------------------------------------------   History is obtained from the patient and the medical record.          History of Present Illness:   Bridgette Mena is a 54 year old male with a history of DM type II, hyperlipidemia, asthma, seizures, polysubstance abuse (alcohol, marijuana, cocaine, and tobacco use), Hep C viral infection, hx of pancreatitis, pancreatic insufficiency, splenic vein thrombosis in 2018, depression, and now admitted for hematochezia and hepatitis.     Patient wakes up occasionally with chest rub, and is complaining of diffuse abdominal pain and bloating. He is unable to remember if he has odynophagia and dysphagia. However, he recalls having nausea and vomiting without hematemesis. Patient states having large amount of alcohol almost every day. He noted having black stools 2 days ago and then it turned into red blood yesterday. Patient uncertain of fever, chills or hx of C. Diff. Blood alcohol level was 0.24, and CT showed pneumonia likely he had some aspiration.         Past Medical History:   Reviewed and edited as appropriate  Past Medical History:   Diagnosis Date     Alcohol abuse     Denies but BAL elevated     Diabetes (H)      Pancreatitis      Polysubstance abuse (H)             Past  Surgical History:   Reviewed and edited as appropriate   History reviewed. No pertinent surgical history.         Previous Endoscopy:   No results found for this or any previous visit.         Social History:   Reviewed and edited as appropriate  Social History     Socioeconomic History     Marital status: Single     Spouse name: Not on file     Number of children: Not on file     Years of education: Not on file     Highest education level: Not on file   Occupational History     Not on file   Social Needs     Financial resource strain: Not on file     Food insecurity:     Worry: Not on file     Inability: Not on file     Transportation needs:     Medical: Not on file     Non-medical: Not on file   Tobacco Use     Smoking status: Current Every Day Smoker     Packs/day: 0.00     Types: Cigarettes   Substance and Sexual Activity     Alcohol use: Yes     Drug use: Not on file     Sexual activity: Not on file   Lifestyle     Physical activity:     Days per week: Not on file     Minutes per session: Not on file     Stress: Not on file   Relationships     Social connections:     Talks on phone: Not on file     Gets together: Not on file     Attends [employer] service: Not on file     Active member of club or organization: Not on file     Attends meetings of clubs or organizations: Not on file     Relationship status: Not on file     Intimate partner violence:     Fear of current or ex partner: Not on file     Emotionally abused: Not on file     Physically abused: Not on file     Forced sexual activity: Not on file   Other Topics Concern     Not on file   Social History Narrative     Not on file            Family History:   Reviewed and edited as appropriate  Family History   Problem Relation Age of Onset     Heart Disease Mother      Heart Disease Father            Allergies:   Reviewed and edited as appropriate     Allergies   Allergen Reactions     Mushrooms [Mushroom] Rash            Medications:     Medications Prior to  "Admission   Medication Sig Dispense Refill Last Dose     albuterol (PROAIR HFA/PROVENTIL HFA/VENTOLIN HFA) 108 (90 Base) MCG/ACT inhaler Inhale 1-2 puffs into the lungs        amylase-lipase-protease (CREON 12) 10952 units CPEP Take 36,000 Units by mouth        calcium carbonate (TUMS) 500 MG chewable tablet Take 500 mg by mouth        escitalopram (LEXAPRO) 10 MG tablet Take 10 mg by mouth        fluticasone (FLONASE) 50 MCG/ACT nasal spray Spray 1 spray in nostril        fluticasone (FLOVENT HFA) 110 MCG/ACT inhaler Inhale 2 puffs into the lungs        folic acid (FOLVITE) 1 MG tablet Take 1 mg by mouth        gabapentin (NEURONTIN) 300 MG capsule Take 300 mg by mouth        insulin glargine (LANTUS PEN) 100 UNIT/ML pen Inject 18 Units Subcutaneous        loratadine (CLARITIN) 10 MG tablet Take 10 mg by mouth        magnesium oxide (MAG-OX) 400 (241.3 Mg) MG tablet Take 400 mg by mouth        mirtazapine (REMERON) 30 MG tablet Take 30 mg by mouth        pantoprazole (PROTONIX) 40 MG EC tablet Take 40 mg by mouth        QUEtiapine (SEROQUEL) 300 MG tablet Take 600 mg by mouth        ranitidine (ZANTAC) 150 MG tablet Take 150 mg by mouth        vitamin D2 (ERGOCALCIFEROL) 92541 units (1250 mcg) capsule Take 50,000 Units by mouth        [] ondansetron (ZOFRAN ODT) 4 MG ODT tab Take 1 tablet (4 mg) by mouth every 8 hours as needed for nausea 10 tablet 0              Review of Systems:   A complete review of systems was performed and is negative except as noted in the HPI           Physical Exam:   /69 (BP Location: Right arm)   Pulse 82   Temp 98.5  F (36.9  C) (Oral)   Resp 20   Ht 1.778 m (5' 10\")   Wt 74 kg (163 lb 3.2 oz)   SpO2 97%   BMI 23.42 kg/m    Wt:   Wt Readings from Last 2 Encounters:   10/20/19 74 kg (163 lb 3.2 oz)   19 83.9 kg (185 lb)      Constitutional: cooperative, pleasant, not dyspneic/diaphoretic, no acute distress  Eyes: Sclera " anicteric/injected  Ears/nose/mouth/throat: Normal oropharynx without ulcers or exudate, mucus membranes moist, hearing intact  Neck: supple, thyroid normal size  CV: Tachycardic but normal rhythm. No edema in LE bilaterally   Respiratory: Unlabored breathing. Crackles in R lung   Lymph: No axillary, submandibular, supraclavicular or inguinal lymphadenopathy  Abd: Distended, +bs, hepatomegaly and diffuse tenderness noted, no peritoneal signs  Skin: warm, perfused, no jaundice  Neuro: AAO x 3, No asterixis  Psych: Normal affect  MSK: No gross deformities          Data:   Labs and imaging below were independently reviewed and interpreted    BMP  Recent Labs   Lab 10/21/19  0312 10/20/19  1605    135   POTASSIUM 4.1 3.2*   CHLORIDE 100 98   NATALIE 7.7* 8.2*   CO2 16* 21   BUN 5* 6*   CR 0.41* 0.37*   GLC 72 69*     CBC  Recent Labs   Lab 10/21/19  0617 10/21/19  0312  10/20/19  1605   WBC 5.6 6.4  --  9.2   RBC 2.45* 2.51*  --  3.15*   HGB 8.7* 8.9*   < > 11.2*   HCT 28.3* 29.0*  --  34.2*   * 110*  --  109*   MCH 35.5* 35.5*  --  35.6*   MCHC 30.7* 30.7*  --  32.7   RDW 14.2 14.2  --  14.0   PLT 81* 91*  --  124*    < > = values in this interval not displayed.     INR  Recent Labs   Lab 10/20/19  1605   INR 1.37*     LFTs  Recent Labs   Lab 10/21/19  0617 10/20/19  1605   ALKPHOS 356* 448*   * 185*   ALT 33 45   BILITOTAL 2.9* 3.8*   PROTTOTAL 7.1 8.7   ALBUMIN 2.2* 2.7*      PANC  Recent Labs   Lab 10/20/19  1605   LIPASE 87       Reviewed imaging:  CT ABDOMEN PELVIS W CONTRAST, 10/20/2019 5:21 PM     TECHNIQUE:  Helical CT images from the lung bases through the  symphysis pubis were obtained with contrast.  Coronal and sagittal  reformatted images were generated at a workstation for further  assessment.     CONTRAST: iopamidol (ISOVUE-370) solution 100 mL     COMPARISON: CT chest on 8/26/2008     HISTORY: Abd distension; Abd infection (incl peritonitis); Nausea,  vomiting; Abd pain, acute,  generalized     FINDINGS:  Lines and tubes: None.  Lower chest:  Patchy groundglass opacities in the right middle lobe. No pleural  effusion. The heart size is within normal limits. No pericardial  effusion.  Circumferential wall thickening of esophagus.     Abdomen and pelvis:     Liver: Hepatomegaly with heterogeneous liver parenchyma. There are 2  subcapsular hypodense structures in the anterior hepatic segment 4  A/B.   Gallbladder: No gallstone. Gallbladder wall thickening with  pericholecystic fluid which is not necessarily in the context of  hepatitis. Periportal edema.  Spleen: Normal size.  Pancreas: No suspicious pancreatic lesions. The pancreatic duct is not  dilated.  Adrenal glands: No adrenal nodules.  Kidneys: No hydronephrosis or obstructing renal stones.  Bladder / Pelvic organs: Unremarkable.  Bowel: No bowel wall thickening. The appendix is unremarkable. No  abnormally dilated loops of small bowel or colon.  Lymph nodes: Prominent mesenteric lymph nodes likely reactive.  Enlarged portacaval lymph node with inflammatory changes in the antoine  hepatis.  Peritoneum / Retroperitoneum: Small amount of perihepatic fluid and  moderate amount of free fluid in the pelvis. No rim-enhancing fluid  collection to suggest abscess formation. Mesenteric edema.  Vessels: No infrarenal aortic aneurysm. Patent portal vein is patent.     Bones and soft tissues: Degenerative changes of the spine. No  suspicious osseous lesion. No acute osseous abnormality.                                                                 IMPRESSION: In summary hepatitis and right middle lobe possible pneumonia.  1. Hepatomegaly with heterogeneous liver attenuation. Differential  includes viral or alcoholic hepatitis and/or hepatic steatosis.   Alternatively, less likely given history, infiltrative malignancy.   2. No gallstones. Gallbladder wall edema likely reactive.  3. Patchy groundglass opacities in the right middle lobe, this can be  due to due   to an infectious process.

## 2019-10-21 NOTE — ED NOTES
[facility], [place]   ED Nurse to Floor Handoff     Bridgette Mena is a 54 year old male who speaks English and lives alone,  in a home  They arrived in the ED by ambulance from home    ED Chief Complaint: Nausea, Vomiting, & Diarrhea (Dizziness since 9/30)    ED Dx;   Final diagnoses:   Gastrointestinal hemorrhage, unspecified gastrointestinal hemorrhage type   Pneumonia of right middle lobe due to infectious organism (H)         Needed?: No    Allergies:   Allergies   Allergen Reactions     Mushrooms [Mushroom] Rash   .  Past Medical Hx:   Past Medical History:   Diagnosis Date     Diabetes (H)       Baseline Mental status: WDL  Current Mental Status changes: at basesline    Infection present or suspected this encounter: no  Sepsis suspected: No  Isolation type: No active isolations     Activity level - Baseline/Home:  Independent  Activity Level - Current:   Independent    Bariatric equipment needed?: No    In the ED these meds were given:   Medications   ondansetron (ZOFRAN) injection 4 mg (4 mg Intravenous Given 10/20/19 1731)   diazepam (VALIUM) tablet 10 mg (has no administration in time range)   vitamin B1 (THIAMINE) tablet 100 mg (100 mg Oral Given 10/20/19 1733)   folic acid (FOLVITE) tablet 1 mg (1 mg Oral Given 10/20/19 1733)   multivitamin w/minerals (THERA-VIT-M) tablet 1 tablet (1 tablet Oral Given 10/20/19 1733)   pantoprazole (PROTONIX) 40 mg IV push injection (has no administration in time range)   pantoprazole (PROTONIX) 80 mg in sodium chloride 0.9 % 100 mL infusion (has no administration in time range)   ampicillin-sulbactam (UNASYN) 3 g vial to attach to  mL bag (has no administration in time range)   octreotide (sandoSTATIN) injection 50 mcg (has no administration in time range)   octreotide (sandoSTATIN) 1,250 mcg in sodium chloride 0.9 % 250 mL (has no administration in time range)   0.9% sodium chloride BOLUS (1,000 mLs Intravenous New Bag  10/20/19 1729)   potassium chloride (KAYCIEL) solution 20 mEq (20 mEq Oral Given 10/20/19 1735)   magnesium sulfate 2 g in NS intermittent infusion ([employer]ar[employer] or [facility] C[facility]) (2 g Intravenous New Bag 10/20/19 1737)   sodium chloride (PF) 0.9% PF flush 75 mL (75 mLs Intravenous Given 10/20/19 1716)   iopamidol (ISOVUE-370) solution 100 mL (100 mLs Intravenous Given 10/20/19 1716)       Drips running?  Yes    Home pump  No    Current LDAs  Peripheral IV 07/18/19 Left Lower forearm (Active)   Number of days: 94       Peripheral IV 10/20/19 Left Lower forearm (Active)   Site Assessment WDL 10/20/2019  4:40 PM   Number of days: 0       Labs results:   Labs Ordered and Resulted from Time of ED Arrival Up to the Time of Departure from the ED   CBC WITH PLATELETS DIFFERENTIAL - Abnormal; Notable for the following components:       Result Value    RBC Count 3.15 (*)     Hemoglobin 11.2 (*)     Hematocrit 34.2 (*)      (*)     MCH 35.6 (*)     Platelet Count 124 (*)     All other components within normal limits   COMPREHENSIVE METABOLIC PANEL - Abnormal; Notable for the following components:    Potassium 3.2 (*)     Anion Gap 17 (*)     Glucose 69 (*)     Urea Nitrogen 6 (*)     Creatinine 0.37 (*)     Calcium 8.2 (*)     Bilirubin Total 3.8 (*)     Albumin 2.7 (*)     Alkaline Phosphatase 448 (*)      (*)     All other components within normal limits   ALCOHOL ETHYL - Abnormal; Notable for the following components:    Ethanol g/dL 0.24 (*)     All other components within normal limits   MAGNESIUM - Abnormal; Notable for the following components:    Magnesium 1.3 (*)     All other components within normal limits   INR - Abnormal; Notable for the following components:    INR 1.37 (*)     All other components within normal limits   ISTAT  GASES LACTATE RILEY POCT - Abnormal; Notable for the following components:    PCO2 Venous 33 (*)     PO2 Venous 52 (*)     All other components within normal limits   LIPASE    PHOSPHORUS   ROUTINE UA WITH MICROSCOPIC REFLEX TO CULTURE   DRUG ABUSE SCREEN 6 CHEM DEP URINE ([facility])   HEPATITIS C RNA QUANTITATIVE   CBC WITH PLATELETS   ISTAT CG4 GASES LACTATE RILEY NURSING POCT   CIWA-AR SCALE AND VS   ASSESS SUICIDALITY   NOTIFY PROVIDER   NOTIFY PROVIDER   ABO/RH TYPE AND SCREEN   ENTERIC BACTERIA AND VIRUS PANEL BY SANDRA STOOL   BLOOD CULTURE   BLOOD CULTURE       Imaging Studies:   Recent Results (from the past 24 hour(s))   CT Abdomen Pelvis w Contrast    Narrative    Preliminary report:  This is a preliminary resident interpretation. Full report to follow.      Impression    IMPRESSION:   1. Hepatomegaly with heterogeneous liver attenuation. Differential  includes viral or chemical hepatitis and/or hepatic steatosis.  2. No gallstones. Gallbladder wall edema likely reactive.  3. Patchy groundglass opacities in the right middle lobe, this can be  due to due to an infectious process.   CT Head w/o Contrast    Narrative    CT HEAD W/O CONTRAST 10/20/2019 5:22 PM    Provided History: Seizure, new, nontraumatic, >40 yrs    Comparison: None.    Technique: Using multidetector thin collimation helical acquisition  technique, axial, coronal and sagittal CT images from the skull base  to the vertex were obtained without intravenous contrast.     Findings:    No intracranial hemorrhage, mass effect, or midline shift. The  ventricles are proportionate to the cerebral sulci. The gray to white  matter differentiation of the cerebral hemispheres is preserved. The  basal cisterns are patent.    The visualized paranasal sinuses are clear. The mastoid air cells are  clear.       Impression    Impression: No acute intracranial pathology.    I have personally reviewed the examination and initial interpretation  and I agree with the findings.    EREN BROCK MD   XR Chest 2 Views    Impression    Impression: No acute airspace opacity       Recent vital signs:   /87   Temp 97.1  F (36.2  C) (Oral)   " Resp 16   Ht 1.778 m (5' 10\")   Wt 74 kg (163 lb 3.2 oz)   SpO2 92%   BMI 23.42 kg/m      Isamar Coma Scale Score: 15 (10/20/19 1544)       Cardiac Rhythm: Normal Sinus  Pt needs tele? Yes  Skin/wound Issues: None    Code Status: Full Code    Pain control: pt had none    Nausea control: good    Abnormal labs/tests/findings requiring intervention: n/a    Family present during ED course? No   Family Comments/Social Situation comments: n/a    Tasks needing completion: None    July Loredo RN  ascom -- [phone] [facility]  [phone] [facility] ED      "

## 2019-10-21 NOTE — OR NURSING
Procedure: EGD no interventions  Sedation: conscious sedation   Specimens: none.   O2: 2L/NC on RA post procedure  Tolerated procedure: well  Report called to 5B. Pt to return there when transport available  Other:  Pt lethargic but will answer to voice and soft touch    Angeles Patterson RN

## 2019-10-21 NOTE — PLAN OF CARE
Somnolent this AM, more easily arousable this afternoon, irritable and falling asleep again quickly after arousal. Pt reports nerve pain in BLE on touch, pulses +2. VSS on 1L oxygen per NC. CIWA score 1, no ativan given this shift. Telemetry monitoring in place, sinus tachycardia with rate decreasing from 140s this AM to 120s this afternoon. Previous shift RN reported x1 occurrence of black tarry stool, no BM this shift. BG monitored, maintained well on IV fluids, NPO for possible GI procedure/GI bleeding. C diff positive, unable to take PO vanco this shift and so started on IV flagyl. PIV x3 intact, x1 infusing protonix and octreotide, x1 infusing TKO and antibiotics, x1 infusing banana bag with D5 1/2NS scheduled continuous after. Magnesium replaced on previous shift, albumin being given now through TKO line. Potential EGD if pt more awake and agreeable to procedure. Continue monitoring for EtOH withdrawal, s/s of bleeding, hypoglycemia,  abnormal VS.

## 2019-10-21 NOTE — PROGRESS NOTES
"CLINICAL NUTRITION SERVICES - ASSESSMENT NOTE     Nutrition Prescription    RECOMMENDATIONS FOR MDs/PROVIDERS TO ORDER:  1. Advance diet as tolerated as medically appropriate to improve nutrition.  2. With diet advancement consider Boost glucose control in between meals to help improve kcal / protein intake   3. Pt currently is on appropriate micronutrient supplementation given alcohol history with possible deficiency  ( Thiamine, Folic acid, multivitamin/minerals)  4.  Mg++/Phos/K+ Monitoring with dextrose containing IVF as potential for refeeding syndrome - Suggest to order high intensity replacement protocols if low levels.     Malnutrition Status:    Severe malnutrition in the context of acute on chronic condition    Recommendations already ordered by Registered Dietitian (RD):  None        REASON FOR ASSESSMENT  Bridgette Mena is a/an 54 year old male assessed by the dietitian for Admission Nutrition Risk Screen for unintentional loss of 10# or more in the past two months    Chart reviewed:  Admitted on 10/20/2019. He has a history of DM2, asthma, seizures, polysubstance abuse and ongoing alcohol use, HCV infection, History of pancreatitis + pancreatic insufficiency and on Creon,     --Presents with bloody diarrhea and hepatitis.  --C.diff diarrhea positive - On PO vanco/ IV flagyl  --Esophagitis on CT 10/20/19 showed circumferential esophageal wall thickening.   --Plan for EGD today 10/21    NUTRITION HISTORY  Unable to obtain, patient sound asleep    CURRENT NUTRITION ORDERS  Diet: NPO since admit yesterday for procedure EGD today     LABS  BUN: 4 ( may reflect inadequate protein intake),   LFT's: elevated  NH3: 89 (H), Total bili: 3.4 (elevated)    MEDICATIONS  Medications reviewed  Flagyl, Vanco,   Thiamine, Folic Acid   Creon 92560 , 3 times daily oral  Theravit-M    ANTHROPOMETRICS  Height: 177.8 cm (5' 10\")  Most Recent Weight: 74 kg (163 lb 3.2 oz), dry admit wt on 10/20    IBW: 75.45 kg ( 98% " IBW)  BMI: 23.42 kg /m2  Normal BMI  Weight History: 9.9 kg wt loss over the past 3 month ( 12% net wt loss)  Wt Readings from Last 20 Encounters:   10/20/19 74 kg (163 lb 3.2 oz)   07/18/19 83.9 kg (185 lb)       Dosing Weight: 74 kg dry admit wt on 10/20, IBW of 75.5 kg     ASSESSED NUTRITION NEEDS  Estimated Energy Needs: 0456-9834 kcals/day (25 - 30 kcals/kg)  Justification: Maintenance  Estimated Protein Needs: 74-89 grams protein/day (1.0-1.2 grams of pro/kg)  Justification: Maintenance  Estimated Fluid Needs: (1 mL/kcal)   Justification: Maintenance    PHYSICAL FINDINGS  See malnutrition section below.    MALNUTRITION  % Intake: Unable to assess, NPO x2 days since admit, Likely <50% >5 days - Severe   % Weight Loss: > 7.5% in 3 months (severe)  Subcutaneous Fat Loss: unable to assess   Muscle Loss: Temporal: Mild   Fluid Accumulation/Edema: None noted  Malnutrition Diagnosis: Severe malnutrition in the context of acute on chronic condition    NUTRITION DIAGNOSIS  Inadequate oral intake related to presentation with likely varying PO intakes PTA in the setting of EtOH use, diffuse abdominal pain/ bloating/diarrhea and likely AMS as evidenced by NPO status for procedure x 1-2 days since admit, significant wt loss over the past 3 month, high ammonia on admit       INTERVENTIONS  Implementation  Nutrition Education: Not appropriate at this time due to patient condition   Medical food supplement therapy: NPO at the moment      Goals  Diet adv v nutrition support within 2-3 days.     Monitoring/Evaluation  Progress toward goals will be monitored and evaluated per protocol.    Griffin Christopher RD/RONAL  Pager [phone]

## 2019-10-21 NOTE — PROGRESS NOTES
Pt arrived to unit 5b from ED at approx. 2155 with all belongings. Pt AxO x4. VSS on RA except tachycardic and tachypneic. Pt had critical BG of 42. Apple juice given, then 25 mg dextrose given; pt BG stabilized.

## 2019-10-22 ENCOUNTER — APPOINTMENT (OUTPATIENT)
Dept: GENERAL RADIOLOGY | Facility: CLINIC | Age: 54
End: 2019-10-22
Attending: PEDIATRICS
Payer: COMMERCIAL

## 2019-10-22 LAB
ALBUMIN SERPL-MCNC: 2.4 G/DL (ref 3.4–5)
ALP SERPL-CCNC: 287 U/L (ref 40–150)
ALT SERPL W P-5'-P-CCNC: 29 U/L (ref 0–70)
ANION GAP SERPL CALCULATED.3IONS-SCNC: 7 MMOL/L (ref 3–14)
AST SERPL W P-5'-P-CCNC: 134 U/L (ref 0–45)
BILIRUB SERPL-MCNC: 2.7 MG/DL (ref 0.2–1.3)
BUN SERPL-MCNC: 2 MG/DL (ref 7–30)
CALCIUM SERPL-MCNC: 7.9 MG/DL (ref 8.5–10.1)
CHLORIDE SERPL-SCNC: 106 MMOL/L (ref 94–109)
CO2 SERPL-SCNC: 25 MMOL/L (ref 20–32)
CREAT SERPL-MCNC: 0.41 MG/DL (ref 0.66–1.25)
ERYTHROCYTE [DISTWIDTH] IN BLOOD BY AUTOMATED COUNT: 14.1 % (ref 10–15)
ERYTHROCYTE [DISTWIDTH] IN BLOOD BY AUTOMATED COUNT: 14.2 % (ref 10–15)
GFR SERPL CREATININE-BSD FRML MDRD: >90 ML/MIN/{1.73_M2}
GLUCOSE BLDC GLUCOMTR-MCNC: 147 MG/DL (ref 70–99)
GLUCOSE BLDC GLUCOMTR-MCNC: 197 MG/DL (ref 70–99)
GLUCOSE BLDC GLUCOMTR-MCNC: 213 MG/DL (ref 70–99)
GLUCOSE SERPL-MCNC: 181 MG/DL (ref 70–99)
HCT VFR BLD AUTO: 29 % (ref 40–53)
HCT VFR BLD AUTO: 32 % (ref 40–53)
HCV RNA SERPL NAA+PROBE-ACNC: NORMAL [IU]/ML
HCV RNA SERPL NAA+PROBE-LOG IU: NORMAL LOG IU/ML
HGB BLD-MCNC: 9.2 G/DL (ref 13.3–17.7)
HGB BLD-MCNC: 9.9 G/DL (ref 13.3–17.7)
LACTATE BLD-SCNC: 2.4 MMOL/L (ref 0.7–2)
LACTATE BLD-SCNC: 2.9 MMOL/L (ref 0.7–2)
LACTATE BLD-SCNC: 3 MMOL/L (ref 0.7–2)
MAGNESIUM SERPL-MCNC: 1.5 MG/DL (ref 1.6–2.3)
MAGNESIUM SERPL-MCNC: 1.9 MG/DL (ref 1.6–2.3)
MCH RBC QN AUTO: 35.2 PG (ref 26.5–33)
MCH RBC QN AUTO: 35.5 PG (ref 26.5–33)
MCHC RBC AUTO-ENTMCNC: 30.9 G/DL (ref 31.5–36.5)
MCHC RBC AUTO-ENTMCNC: 31.7 G/DL (ref 31.5–36.5)
MCV RBC AUTO: 112 FL (ref 78–100)
MCV RBC AUTO: 114 FL (ref 78–100)
PHOSPHATE SERPL-MCNC: 0.5 MG/DL (ref 2.5–4.5)
PHOSPHATE SERPL-MCNC: 0.8 MG/DL (ref 2.5–4.5)
PLATELET # BLD AUTO: 106 10E9/L (ref 150–450)
PLATELET # BLD AUTO: 94 10E9/L (ref 150–450)
POTASSIUM SERPL-SCNC: 3.7 MMOL/L (ref 3.4–5.3)
PROT SERPL-MCNC: 6.9 G/DL (ref 6.8–8.8)
RBC # BLD AUTO: 2.59 10E12/L (ref 4.4–5.9)
RBC # BLD AUTO: 2.81 10E12/L (ref 4.4–5.9)
SODIUM SERPL-SCNC: 139 MMOL/L (ref 133–144)
WBC # BLD AUTO: 4 10E9/L (ref 4–11)
WBC # BLD AUTO: 5.3 10E9/L (ref 4–11)

## 2019-10-22 PROCEDURE — 85027 COMPLETE CBC AUTOMATED: CPT | Performed by: INTERNAL MEDICINE

## 2019-10-22 PROCEDURE — 80053 COMPREHEN METABOLIC PANEL: CPT | Performed by: INTERNAL MEDICINE

## 2019-10-22 PROCEDURE — 25000128 H RX IP 250 OP 636: Performed by: NURSE PRACTITIONER

## 2019-10-22 PROCEDURE — 25000132 ZZH RX MED GY IP 250 OP 250 PS 637: Performed by: NURSE PRACTITIONER

## 2019-10-22 PROCEDURE — 25800025 ZZH RX 258: Performed by: HOSPITALIST

## 2019-10-22 PROCEDURE — 83605 ASSAY OF LACTIC ACID: CPT | Performed by: PHYSICIAN ASSISTANT

## 2019-10-22 PROCEDURE — 12000001 ZZH R&B MED SURG/OB UMMC

## 2019-10-22 PROCEDURE — 87040 BLOOD CULTURE FOR BACTERIA: CPT | Performed by: PEDIATRICS

## 2019-10-22 PROCEDURE — C9113 INJ PANTOPRAZOLE SODIUM, VIA: HCPCS | Performed by: NURSE PRACTITIONER

## 2019-10-22 PROCEDURE — 74019 RADEX ABDOMEN 2 VIEWS: CPT

## 2019-10-22 PROCEDURE — 40000275 ZZH STATISTIC RCP TIME EA 10 MIN

## 2019-10-22 PROCEDURE — 84100 ASSAY OF PHOSPHORUS: CPT | Performed by: INTERNAL MEDICINE

## 2019-10-22 PROCEDURE — 99232 SBSQ HOSP IP/OBS MODERATE 35: CPT | Performed by: PEDIATRICS

## 2019-10-22 PROCEDURE — 25800030 ZZH RX IP 258 OP 636: Performed by: NURSE PRACTITIONER

## 2019-10-22 PROCEDURE — 25000132 ZZH RX MED GY IP 250 OP 250 PS 637: Performed by: HOSPITALIST

## 2019-10-22 PROCEDURE — 36415 COLL VENOUS BLD VENIPUNCTURE: CPT | Performed by: INTERNAL MEDICINE

## 2019-10-22 PROCEDURE — 36415 COLL VENOUS BLD VENIPUNCTURE: CPT | Performed by: PHYSICIAN ASSISTANT

## 2019-10-22 PROCEDURE — 25000132 ZZH RX MED GY IP 250 OP 250 PS 637: Performed by: PEDIATRICS

## 2019-10-22 PROCEDURE — 25800030 ZZH RX IP 258 OP 636: Performed by: PEDIATRICS

## 2019-10-22 PROCEDURE — 83735 ASSAY OF MAGNESIUM: CPT | Performed by: INTERNAL MEDICINE

## 2019-10-22 PROCEDURE — 84100 ASSAY OF PHOSPHORUS: CPT | Performed by: PEDIATRICS

## 2019-10-22 PROCEDURE — 36415 COLL VENOUS BLD VENIPUNCTURE: CPT | Performed by: PEDIATRICS

## 2019-10-22 PROCEDURE — 83735 ASSAY OF MAGNESIUM: CPT | Performed by: PEDIATRICS

## 2019-10-22 PROCEDURE — 25000128 H RX IP 250 OP 636: Performed by: PEDIATRICS

## 2019-10-22 PROCEDURE — 00000146 ZZHCL STATISTIC GLUCOSE BY METER IP

## 2019-10-22 PROCEDURE — 93005 ELECTROCARDIOGRAM TRACING: CPT

## 2019-10-22 PROCEDURE — 25000125 ZZHC RX 250: Performed by: PEDIATRICS

## 2019-10-22 PROCEDURE — 83605 ASSAY OF LACTIC ACID: CPT | Performed by: PEDIATRICS

## 2019-10-22 PROCEDURE — 25000128 H RX IP 250 OP 636: Performed by: INTERNAL MEDICINE

## 2019-10-22 PROCEDURE — 93010 ELECTROCARDIOGRAM REPORT: CPT | Performed by: INTERNAL MEDICINE

## 2019-10-22 RX ORDER — DIPHENHYDRAMINE HCL 25 MG
50 CAPSULE ORAL EVERY 6 HOURS PRN
Status: DISCONTINUED | OUTPATIENT
Start: 2019-10-22 | End: 2019-10-25

## 2019-10-22 RX ORDER — LIDOCAINE 40 MG/G
CREAM TOPICAL
Status: DISCONTINUED | OUTPATIENT
Start: 2019-10-22 | End: 2019-10-25

## 2019-10-22 RX ORDER — PANTOPRAZOLE SODIUM 40 MG/1
40 TABLET, DELAYED RELEASE ORAL
Status: DISCONTINUED | OUTPATIENT
Start: 2019-10-22 | End: 2019-10-25

## 2019-10-22 RX ORDER — DIPHENHYDRAMINE HYDROCHLORIDE 50 MG/ML
50 INJECTION INTRAMUSCULAR; INTRAVENOUS ONCE
Status: DISCONTINUED | OUTPATIENT
Start: 2019-10-22 | End: 2019-10-22

## 2019-10-22 RX ADMIN — MIRTAZAPINE 30 MG: 15 TABLET, FILM COATED ORAL at 22:12

## 2019-10-22 RX ADMIN — VANCOMYCIN HYDROCHLORIDE 125 MG: KIT at 16:45

## 2019-10-22 RX ADMIN — MAGNESIUM SULFATE HEPTAHYDRATE 4 G: 40 INJECTION, SOLUTION INTRAVENOUS at 10:34

## 2019-10-22 RX ADMIN — VANCOMYCIN HYDROCHLORIDE 125 MG: KIT at 20:14

## 2019-10-22 RX ADMIN — PANCRELIPASE 36000 UNITS: 60000; 12000; 38000 CAPSULE, DELAYED RELEASE PELLETS ORAL at 17:31

## 2019-10-22 RX ADMIN — THIAMINE HCL TAB 100 MG 100 MG: 100 TAB at 08:17

## 2019-10-22 RX ADMIN — DEXTROSE MONOHYDRATE, SODIUM CHLORIDE, SODIUM LACTATE, POTASSIUM CHLORIDE, CALCIUM CHLORIDE: 5; 600; 310; 179; 20 INJECTION, SOLUTION INTRAVENOUS at 23:47

## 2019-10-22 RX ADMIN — QUETIAPINE 600 MG: 300 TABLET, FILM COATED ORAL at 22:11

## 2019-10-22 RX ADMIN — RANITIDINE 150 MG: 150 TABLET ORAL at 16:45

## 2019-10-22 RX ADMIN — PANTOPRAZOLE SODIUM 40 MG: 40 TABLET, DELAYED RELEASE ORAL at 16:45

## 2019-10-22 RX ADMIN — POTASSIUM PHOSPHATE, MONOBASIC AND POTASSIUM PHOSPHATE, DIBASIC 25 MMOL: 224; 236 INJECTION, SOLUTION INTRAVENOUS at 12:49

## 2019-10-22 RX ADMIN — ESCITALOPRAM 10 MG: 5 TABLET, FILM COATED ORAL at 08:16

## 2019-10-22 RX ADMIN — PANCRELIPASE 36000 UNITS: 60000; 12000; 38000 CAPSULE, DELAYED RELEASE PELLETS ORAL at 08:22

## 2019-10-22 RX ADMIN — METRONIDAZOLE 500 MG: 500 INJECTION, SOLUTION INTRAVENOUS at 03:53

## 2019-10-22 RX ADMIN — SODIUM CHLORIDE 1000 ML: 9 INJECTION, SOLUTION INTRAVENOUS at 09:44

## 2019-10-22 RX ADMIN — SODIUM CHLORIDE 1000 ML: 9 INJECTION, SOLUTION INTRAVENOUS at 17:23

## 2019-10-22 RX ADMIN — FLUTICASONE FUROATE 1 PUFF: 200 POWDER RESPIRATORY (INHALATION) at 08:17

## 2019-10-22 RX ADMIN — ACETAMINOPHEN 650 MG: 325 TABLET, FILM COATED ORAL at 08:21

## 2019-10-22 RX ADMIN — VANCOMYCIN HYDROCHLORIDE 125 MG: KIT at 12:49

## 2019-10-22 RX ADMIN — VANCOMYCIN HYDROCHLORIDE 125 MG: KIT at 08:17

## 2019-10-22 RX ADMIN — FOLIC ACID 1 MG: 1 TABLET ORAL at 08:17

## 2019-10-22 RX ADMIN — PANCRELIPASE 36000 UNITS: 60000; 12000; 38000 CAPSULE, DELAYED RELEASE PELLETS ORAL at 12:49

## 2019-10-22 RX ADMIN — GABAPENTIN 300 MG: 300 CAPSULE ORAL at 22:11

## 2019-10-22 RX ADMIN — DIPHENHYDRAMINE HYDROCHLORIDE 50 MG: 25 CAPSULE ORAL at 16:45

## 2019-10-22 RX ADMIN — MULTIPLE VITAMINS W/ MINERALS TAB 1 TABLET: TAB at 08:17

## 2019-10-22 RX ADMIN — ACETAMINOPHEN 650 MG: 325 TABLET, FILM COATED ORAL at 22:11

## 2019-10-22 RX ADMIN — DEXTROSE MONOHYDRATE, SODIUM CHLORIDE, SODIUM LACTATE, POTASSIUM CHLORIDE, CALCIUM CHLORIDE: 5; 600; 310; 179; 20 INJECTION, SOLUTION INTRAVENOUS at 20:50

## 2019-10-22 RX ADMIN — PANTOPRAZOLE SODIUM 8 MG/HR: 40 INJECTION, POWDER, FOR SOLUTION INTRAVENOUS at 05:31

## 2019-10-22 ASSESSMENT — ACTIVITIES OF DAILY LIVING (ADL)
ADLS_ACUITY_SCORE: 19
ADLS_ACUITY_SCORE: 18
ADLS_ACUITY_SCORE: 19
ADLS_ACUITY_SCORE: 18

## 2019-10-22 ASSESSMENT — MIFFLIN-ST. JEOR: SCORE: 1707.63

## 2019-10-22 NOTE — PROVIDER NOTIFICATION
Paged [phone]    High, please call back: Pt reports eating mushrooms in his meal which he is allergic to. Can you put an order in for Benadryl?

## 2019-10-22 NOTE — PLAN OF CARE
A&O, VSS on room air. Up with standby assist, calling appropriately this shift. PIV x3 intact, x1 infusing phosphorous replacement, x1 infusing MIVF, x1 infusing TKO. Magnesium replaced this shift, recheck scheduled for 1430. CIWA score of 2. Telemetry monitoring in place, tachy up to 160s this AM sustained in 140s, MD notified and 1L NS bolus given with HR in 110's-120's after, pt asymptomatic throughout. Good appetite, ate 75% of breakfast, had increased abdominal pain after that subsided with rest and repositioning. Tylenol given this AM for headache with relief. BG elevated this shift, note left for MDs to consider sliding scale insulin, oncoming RN notified. Continue with POC.

## 2019-10-22 NOTE — PLAN OF CARE
PT 5B: hold; attempted pt for PT evaluation in AM.  bpm in supine, will initiate as medically appropriate.

## 2019-10-22 NOTE — PLAN OF CARE
Pt AOx4, asleep most of night. VSS on RA except w/tachycardia. CIWA 0. Pt on tele w/tachycardia. LPIV & RPIV infusing protonix & octreotide gtt, D5 in LR w/KCL 20 & ABX intermittently. SBA w/transfers. No BM overnight. Able to void spontaneously. Regular diet. Able to use call light appropriately & make needs known. Continue to monitor & follow POC.

## 2019-10-22 NOTE — PROGRESS NOTES
[facility], [facility] Progress Note - Hospitalist Service, [place]       Date of Admission:  10/20/2019  Assessment & Plan   Bridgette Mena is a 54 year old male admitted on 10/20/2019. He has a history of DM, asthma, seizures, polysubstance abuse, recurrent pancreatitis and ongoing alcohol use (patient denies but BAL .24 on admission) who presents with bloody diarrhea and hepatitis.       # Worsening Abdominal Pain today  May simply be from starting to eat. May be recurrence of pancreatitis. In the setting of C. Diff major concern would be malathi colon, has distend, soft, tympanic abdomen  - ABX now  - if no bunny colon will monitor clinicaly, low threshold to look for recurrent pancreatitis    #Tachycardia: sinus  This is likely physiologic from illness/dehydration/withdrawl.  Hit 200 on the monitor, but on tele review only 160, but this is still quite high. Patient asymptomatic.  - EKG  - 1 L NS bolus    # Hematochezia and Melena   # Cdiff diarrhea   # Esophagitis on CT  Presents with loose, oily stools mised with dark red blood ongoing for two weeks.  ~10 episodes daily with no associated fevers.  Notes significant associated bloating.  He has a history of alcohol hepatitis and ongoing drinking (patient denies but BAL elevated and he has a history or alcohol withdrawal).   - C diff positive, on PO vanc now awake, will stop IV flagyl  - Stop octreotide   - Change PPI to oral  - Hgb q12h with ongoing diarrhea  - GI consulted, likely EGD today      # Alcoholic hepatitis,   # Alcohol abuse/dependence  Patient with long history of polysubstance abuse who is not forthcoming regarding current alcohol use.  AST/ALT consistent with alcoholic hepatitis.  He had an extensive work up at [facility] on 5/10/2019 admission with negative anti smooth, PAULA < 1:80, negative Hep B, positive Hep C antibody but undetectable hep C via PCR.  - GI consulted as above  - IVF, CIWA  - CD consult when awake  enough   - low phos in setting of likely liver injury, replacing aggressively     # Question of PNA  - Patchy GGO on abd CT, unclear if cough, afebrile and without leukocytosis. Could be aspiration pneumonitis  - Will monitor off abx     # History of DM II  # Hypoglycemia   - Will hold off home lantus 18 units QHS  - QID blood sugars  - Currently on D5     # History of asthma  - Continue home flovent, albuterol    # Bipolar d/o  - Cont home Seroquel, Remeron   - Unclear if taking Lexapro    # Somnolence  Likely 2/2 acute illness, medications and EtOH use. CTH negative. Ammonia 89, but no asterixis on exam, UDS positive only for EtOH and monitor, but improved some 10/22    - Hold gabapentin   - low threshold to start lactulose      Diet: Regular Diet Adult    DVT Prophylaxis: Pneumatic Compression Devices  Garcia Catheter: not present  Code Status: Full Code      Disposition Plan   Expected discharge: 4 - 7 days, recommended to prior living arrangement once antibiotic plan established, hemoglobin stable and mental status at baseline.  Entered: Marjorie Cruz MD 10/22/2019, 9:57 AM       The patient's care was discussed with the Bedside Nurse and Care Coordinator/.    Marjorie Cruz MD  Hospitalist Service, [address], [place]  Pager: [phone]  Please see sticky note for cross cover information  ______________________________________________________________________    Interval History   Patient somnolent but arousable. Answers questions. Ate.  States that his abdominal pain is worse, this is immediately after eating and he is able to move comfortably in bed.  Had elevated HR.  Up to bathroom when elevated HR occurred.  Pleasant gentleman.      Data reviewed today: I reviewed all medications, new labs and imaging results over the last 24 hours. I personally reviewed the EKG tracing showing sinus tachycardia .    Physical Exam   Vital Signs: Temp: 98.2  F (36.8  " C) Temp src: Oral BP: 130/83 Pulse: 112 Heart Rate: 120 Resp: 20 SpO2: 92 % O2 Device: None (Room air)    Weight: 173 lbs 11.2 oz  Constitutional: Somnolent but arousable, no apparent distress  Respiratory: CTAB, anteriorly   Cardiovascular: Tachycardic but regular, no edema   GI: Slightly distended, mild TTP, soft   Skin/Integumen: No rashes, no cyanosis    Neuro: oriented to person and place, but not date. Stats it is Oct 10th. Knows \"kathleen Flaherty\" is president.       Data   Recent Labs   Lab 10/22/19  0632 10/21/19  1812 10/21/19  1147 10/21/19  0617 10/21/19  0312  10/20/19  1605   WBC 4.0 4.6 5.8 5.6 6.4  --  9.2   HGB 9.2* 8.9* 8.5* 8.7* 8.9*   < > 11.2*   * 113* 114* 116* 110*  --  109*   PLT 94* 87* 82* 81* 91*  --  124*   INR  --   --   --   --   --   --  1.37*     --  136  --  134  --  135   POTASSIUM 3.7  --  4.1  --  4.1  --  3.2*   CHLORIDE 106  --  101  --  100  --  98   CO2 25  --  19*  --  16*  --  21   BUN 2*  --  4*  --  5*  --  6*   CR 0.41*  --  0.39*  --  0.41*  --  0.37*   ANIONGAP 7  --  16*  --  18*  --  17*   NATALIE 7.9*  --  7.6*  --  7.7*  --  8.2*   *  --  166*  --  72  --  69*   ALBUMIN 2.4*  --  2.2* 2.2*  --   --  2.7*   PROTTOTAL 6.9  --  7.1 7.1  --   --  8.7   BILITOTAL 2.7*  --  3.4* 2.9*  --   --  3.8*   ALKPHOS 287*  --  351* 356*  --   --  448*   ALT 29  --  37 33  --   --  45   *  --  151* 150*  --   --  185*   LIPASE  --   --   --   --   --   --  87   TROPI  --   --   --  <0.015  --   --   --     < > = values in this interval not displayed.     "

## 2019-10-22 NOTE — PROGRESS NOTES
GASTROENTEROLOGY PROGRESS NOTE    ASSESSMENT:    Bridgette Mena is a 54 year old male with a history of DM type II, hyperlipidemia, asthma, seizures, polysubstance abuse (alcohol, marijuana, cocaine, and tobacco use), Hep C viral infection ? (Undetectable PCR), hx of pancreatitis, pancreatic insufficiency on Creon, splenic vein thrombosis in 2018, depression, and now admitted for hematochezia and hepatitis.        #GIB 2/2 MWT    #C. Diff infection  GIB likely secondary to MWT and esophagitis. Also noted on EGD portal hypertensive gastropathy with small EV and gastric varices although not actively bleeding. Patient is complaining of diffuse abdominal pain and bloating likely secondary to C.Diff colitis.  No colitis noted on CT from 10/20/19. Was given IV flagyl initially given not able to take PO vancomycin due to been somnolent.   --Continue high dose PPI 40mg PO BID  --Ok to discontinue octreotide drip   --Trend hemoglobin and transfuse if <7  --PO vancomycin 125mg QID for total of 10 days     #Splenic vein thrombosis   #Possible cirrhosis vs non-cirrhotic portal hypertension  abnormal synthetic function of the liver, imaging not consistent with cirrhosis.  Possible etiology likely secondary to alcohol abuse.  If non-cirrhotic portal hypertension identified this could likely be secondary to the splenic vein thrombosis in the setting of prior history of pancreatitis.  --Obtain abdominal US with dopplers   --Trend LFTs   --Hepatology team will continue to follow along and will schedule hepatology clinic follow up as an outpatient       The patient was discussed and plan agreed upon with GI staff.    Héctor Martinez MD  GI Fellow  _______________________________________________________________  S: More alert this am. Reports abdominal pain. No signs of overt bleeding.     O:  Blood pressure 130/83, pulse 112, temperature 98.2  F (36.8  C), temperature source Oral, resp. rate 20, height 1.778 m (5'  "10\"), weight 78.8 kg (173 lb 11.2 oz), SpO2 92 %.    Gen:Confused, no acute distress   HEENT: mucosa moist, no scleral icterus  CV: RRR  Lungs: Clear bilaterally   Abd: slightly distended, no peritoneal signs   Skin: no jaundice  MS: moves all 4 ext  Neuro: no focal deficits        LABS:  BMP  Recent Labs   Lab 10/22/19  0632 10/21/19  1147 10/21/19  0312 10/20/19  1605    136 134 135   POTASSIUM 3.7 4.1 4.1 3.2*   CHLORIDE 106 101 100 98   NATALIE 7.9* 7.6* 7.7* 8.2*   CO2 25 19* 16* 21   BUN 2* 4* 5* 6*   CR 0.41* 0.39* 0.41* 0.37*   * 166* 72 69*     CBC  Recent Labs   Lab 10/22/19  0632 10/21/19  1812 10/21/19  1147 10/21/19  0617   WBC 4.0 4.6 5.8 5.6   RBC 2.59* 2.47* 2.37* 2.45*   HGB 9.2* 8.9* 8.5* 8.7*   HCT 29.0* 28.0* 27.1* 28.3*   * 113* 114* 116*   MCH 35.5* 36.0* 35.9* 35.5*   MCHC 31.7 31.8 31.4* 30.7*   RDW 14.2 14.2 14.2 14.2   PLT 94* 87* 82* 81*     INR  Recent Labs   Lab 10/20/19  1605   INR 1.37*     LFTs  Recent Labs   Lab 10/22/19  0632 10/21/19  1147 10/21/19  0617 10/20/19  1605   ALKPHOS 287* 351* 356* 448*   * 151* 150* 185*   ALT 29 37 33 45   BILITOTAL 2.7* 3.4* 2.9* 3.8*   PROTTOTAL 6.9 7.1 7.1 8.7   ALBUMIN 2.4* 2.2* 2.2* 2.7*      PANC  Recent Labs   Lab 10/20/19  1605   LIPASE 87       "

## 2019-10-22 NOTE — PLAN OF CARE
Care assumed [id number]. Pt alert and oriented prior to scope, lethargic after receiving benadryl during scope. VSS, tachycardic, on room air. CIWA score 1. Telemetry monitoring- sinus tachy. BG monitored- BG level 237 at 2200, MD started pt back on daily lantus. PIV left arm, left hand, and right arm infusing. Protonix, octreotide, D5 in LR with KCl 20 infusing, and intermittent abx infusing. Pt awake to take oral vanco. Up with sba. Regular diet- good appetite. Urinates spontaneously. No BM this shift. Plan to follow up with provider about oral/iv abx for c. Diff treatment.

## 2019-10-23 ENCOUNTER — APPOINTMENT (OUTPATIENT)
Dept: ULTRASOUND IMAGING | Facility: CLINIC | Age: 54
End: 2019-10-23
Attending: PEDIATRICS
Payer: COMMERCIAL

## 2019-10-23 ENCOUNTER — APPOINTMENT (OUTPATIENT)
Dept: OCCUPATIONAL THERAPY | Facility: CLINIC | Age: 54
End: 2019-10-23
Attending: INTERNAL MEDICINE
Payer: COMMERCIAL

## 2019-10-23 LAB
ALBUMIN SERPL-MCNC: 2.2 G/DL (ref 3.4–5)
ALP SERPL-CCNC: 277 U/L (ref 40–150)
ALT SERPL W P-5'-P-CCNC: 37 U/L (ref 0–70)
ANION GAP SERPL CALCULATED.3IONS-SCNC: 7 MMOL/L (ref 3–14)
AST SERPL W P-5'-P-CCNC: 128 U/L (ref 0–45)
BILIRUB SERPL-MCNC: 2.4 MG/DL (ref 0.2–1.3)
BUN SERPL-MCNC: 2 MG/DL (ref 7–30)
CALCIUM SERPL-MCNC: 7.8 MG/DL (ref 8.5–10.1)
CHLORIDE SERPL-SCNC: 107 MMOL/L (ref 94–109)
CO2 SERPL-SCNC: 25 MMOL/L (ref 20–32)
CREAT SERPL-MCNC: 0.34 MG/DL (ref 0.66–1.25)
ERYTHROCYTE [DISTWIDTH] IN BLOOD BY AUTOMATED COUNT: 14.3 % (ref 10–15)
GFR SERPL CREATININE-BSD FRML MDRD: >90 ML/MIN/{1.73_M2}
GLUCOSE BLDC GLUCOMTR-MCNC: 105 MG/DL (ref 70–99)
GLUCOSE BLDC GLUCOMTR-MCNC: 106 MG/DL (ref 70–99)
GLUCOSE BLDC GLUCOMTR-MCNC: 136 MG/DL (ref 70–99)
GLUCOSE SERPL-MCNC: 159 MG/DL (ref 70–99)
HCT VFR BLD AUTO: 28.4 % (ref 40–53)
HGB BLD-MCNC: 8.9 G/DL (ref 13.3–17.7)
INTERPRETATION ECG - MUSE: NORMAL
INTERPRETATION ECG - MUSE: NORMAL
MAGNESIUM SERPL-MCNC: 1.5 MG/DL (ref 1.6–2.3)
MAGNESIUM SERPL-MCNC: 2.2 MG/DL (ref 1.6–2.3)
MCH RBC QN AUTO: 35.6 PG (ref 26.5–33)
MCHC RBC AUTO-ENTMCNC: 31.3 G/DL (ref 31.5–36.5)
MCV RBC AUTO: 114 FL (ref 78–100)
PHOSPHATE SERPL-MCNC: 2 MG/DL (ref 2.5–4.5)
PHOSPHATE SERPL-MCNC: 2.3 MG/DL (ref 2.5–4.5)
PLATELET # BLD AUTO: 96 10E9/L (ref 150–450)
POTASSIUM SERPL-SCNC: 3.8 MMOL/L (ref 3.4–5.3)
PROT SERPL-MCNC: 6.6 G/DL (ref 6.8–8.8)
RBC # BLD AUTO: 2.5 10E12/L (ref 4.4–5.9)
SODIUM SERPL-SCNC: 139 MMOL/L (ref 133–144)
WBC # BLD AUTO: 4.3 10E9/L (ref 4–11)

## 2019-10-23 PROCEDURE — 25000128 H RX IP 250 OP 636: Performed by: NURSE PRACTITIONER

## 2019-10-23 PROCEDURE — 36415 COLL VENOUS BLD VENIPUNCTURE: CPT | Performed by: PEDIATRICS

## 2019-10-23 PROCEDURE — 12000001 ZZH R&B MED SURG/OB UMMC

## 2019-10-23 PROCEDURE — 97165 OT EVAL LOW COMPLEX 30 MIN: CPT | Mod: GO

## 2019-10-23 PROCEDURE — 25000132 ZZH RX MED GY IP 250 OP 250 PS 637: Performed by: HOSPITALIST

## 2019-10-23 PROCEDURE — 25000125 ZZHC RX 250: Performed by: PEDIATRICS

## 2019-10-23 PROCEDURE — 25000132 ZZH RX MED GY IP 250 OP 250 PS 637: Performed by: NURSE PRACTITIONER

## 2019-10-23 PROCEDURE — 97535 SELF CARE MNGMENT TRAINING: CPT | Mod: GO

## 2019-10-23 PROCEDURE — 00000146 ZZHCL STATISTIC GLUCOSE BY METER IP

## 2019-10-23 PROCEDURE — 83735 ASSAY OF MAGNESIUM: CPT | Performed by: PEDIATRICS

## 2019-10-23 PROCEDURE — 99233 SBSQ HOSP IP/OBS HIGH 50: CPT | Performed by: PEDIATRICS

## 2019-10-23 PROCEDURE — 25800025 ZZH RX 258: Performed by: HOSPITALIST

## 2019-10-23 PROCEDURE — 40000141 ZZH STATISTIC PERIPHERAL IV START W/O US GUIDANCE

## 2019-10-23 PROCEDURE — 83735 ASSAY OF MAGNESIUM: CPT | Performed by: INTERNAL MEDICINE

## 2019-10-23 PROCEDURE — 84100 ASSAY OF PHOSPHORUS: CPT | Performed by: PEDIATRICS

## 2019-10-23 PROCEDURE — 85027 COMPLETE CBC AUTOMATED: CPT | Performed by: INTERNAL MEDICINE

## 2019-10-23 PROCEDURE — 80053 COMPREHEN METABOLIC PANEL: CPT | Performed by: INTERNAL MEDICINE

## 2019-10-23 PROCEDURE — 84100 ASSAY OF PHOSPHORUS: CPT | Performed by: INTERNAL MEDICINE

## 2019-10-23 PROCEDURE — 25800030 ZZH RX IP 258 OP 636: Performed by: PEDIATRICS

## 2019-10-23 PROCEDURE — 25000132 ZZH RX MED GY IP 250 OP 250 PS 637: Performed by: PEDIATRICS

## 2019-10-23 PROCEDURE — 25000128 H RX IP 250 OP 636: Performed by: PEDIATRICS

## 2019-10-23 PROCEDURE — 36415 COLL VENOUS BLD VENIPUNCTURE: CPT | Performed by: INTERNAL MEDICINE

## 2019-10-23 PROCEDURE — 76700 US EXAM ABDOM COMPLETE: CPT

## 2019-10-23 RX ORDER — SODIUM CHLORIDE AND POTASSIUM CHLORIDE 150; 900 MG/100ML; MG/100ML
INJECTION, SOLUTION INTRAVENOUS CONTINUOUS
Status: DISCONTINUED | OUTPATIENT
Start: 2019-10-23 | End: 2019-10-24

## 2019-10-23 RX ORDER — SODIUM CHLORIDE AND POTASSIUM CHLORIDE 150; 900 MG/100ML; MG/100ML
INJECTION, SOLUTION INTRAVENOUS CONTINUOUS
Status: DISCONTINUED | OUTPATIENT
Start: 2019-10-23 | End: 2019-10-23

## 2019-10-23 RX ADMIN — ALBUTEROL SULFATE 2 PUFF: 90 AEROSOL, METERED RESPIRATORY (INHALATION) at 18:06

## 2019-10-23 RX ADMIN — FOLIC ACID 1 MG: 1 TABLET ORAL at 10:00

## 2019-10-23 RX ADMIN — PANCRELIPASE 36000 UNITS: 60000; 12000; 38000 CAPSULE, DELAYED RELEASE PELLETS ORAL at 16:41

## 2019-10-23 RX ADMIN — PANTOPRAZOLE SODIUM 40 MG: 40 TABLET, DELAYED RELEASE ORAL at 10:01

## 2019-10-23 RX ADMIN — OXYCODONE HYDROCHLORIDE 5 MG: 5 TABLET ORAL at 10:57

## 2019-10-23 RX ADMIN — OXYCODONE HYDROCHLORIDE 5 MG: 5 TABLET ORAL at 01:43

## 2019-10-23 RX ADMIN — VANCOMYCIN HYDROCHLORIDE 125 MG: KIT at 13:02

## 2019-10-23 RX ADMIN — VANCOMYCIN HYDROCHLORIDE 125 MG: KIT at 20:12

## 2019-10-23 RX ADMIN — VANCOMYCIN HYDROCHLORIDE 125 MG: KIT at 08:51

## 2019-10-23 RX ADMIN — ONDANSETRON 4 MG: 4 TABLET, ORALLY DISINTEGRATING ORAL at 16:49

## 2019-10-23 RX ADMIN — MULTIPLE VITAMINS W/ MINERALS TAB 1 TABLET: TAB at 10:00

## 2019-10-23 RX ADMIN — ESCITALOPRAM 10 MG: 5 TABLET, FILM COATED ORAL at 10:00

## 2019-10-23 RX ADMIN — FLUTICASONE FUROATE 1 PUFF: 200 POWDER RESPIRATORY (INHALATION) at 08:49

## 2019-10-23 RX ADMIN — POTASSIUM CHLORIDE AND SODIUM CHLORIDE: 900; 150 INJECTION, SOLUTION INTRAVENOUS at 17:55

## 2019-10-23 RX ADMIN — SODIUM CHLORIDE 861 ML: 9 INJECTION, SOLUTION INTRAVENOUS at 15:06

## 2019-10-23 RX ADMIN — PANCRELIPASE 36000 UNITS: 60000; 12000; 38000 CAPSULE, DELAYED RELEASE PELLETS ORAL at 10:00

## 2019-10-23 RX ADMIN — POTASSIUM PHOSPHATE, MONOBASIC AND POTASSIUM PHOSPHATE, DIBASIC 25 MMOL: 224; 236 INJECTION, SOLUTION INTRAVENOUS at 01:25

## 2019-10-23 RX ADMIN — RANITIDINE 150 MG: 150 TABLET ORAL at 10:57

## 2019-10-23 RX ADMIN — MAGNESIUM SULFATE HEPTAHYDRATE 4 G: 40 INJECTION, SOLUTION INTRAVENOUS at 08:49

## 2019-10-23 RX ADMIN — MIRTAZAPINE 30 MG: 15 TABLET, FILM COATED ORAL at 21:04

## 2019-10-23 RX ADMIN — POTASSIUM PHOSPHATE, MONOBASIC AND POTASSIUM PHOSPHATE, DIBASIC 15 MMOL: 224; 236 INJECTION, SOLUTION INTRAVENOUS at 10:59

## 2019-10-23 RX ADMIN — OXYCODONE HYDROCHLORIDE 5 MG: 5 TABLET ORAL at 16:49

## 2019-10-23 RX ADMIN — GABAPENTIN 300 MG: 300 CAPSULE ORAL at 21:04

## 2019-10-23 RX ADMIN — PANTOPRAZOLE SODIUM 40 MG: 40 TABLET, DELAYED RELEASE ORAL at 16:41

## 2019-10-23 RX ADMIN — THIAMINE HCL TAB 100 MG 100 MG: 100 TAB at 10:00

## 2019-10-23 RX ADMIN — DEXTROSE MONOHYDRATE, SODIUM CHLORIDE, SODIUM LACTATE, POTASSIUM CHLORIDE, CALCIUM CHLORIDE: 5; 600; 310; 179; 20 INJECTION, SOLUTION INTRAVENOUS at 13:03

## 2019-10-23 RX ADMIN — VANCOMYCIN HYDROCHLORIDE 125 MG: KIT at 16:41

## 2019-10-23 RX ADMIN — OXYCODONE HYDROCHLORIDE 5 MG: 5 TABLET ORAL at 21:03

## 2019-10-23 RX ADMIN — QUETIAPINE 600 MG: 300 TABLET, FILM COATED ORAL at 21:03

## 2019-10-23 ASSESSMENT — MIFFLIN-ST. JEOR: SCORE: 1678.6

## 2019-10-23 ASSESSMENT — ACTIVITIES OF DAILY LIVING (ADL)
ADLS_ACUITY_SCORE: 13
ADLS_ACUITY_SCORE: 13
ADLS_ACUITY_SCORE: 18
ADLS_ACUITY_SCORE: 13
PREVIOUS_RESPONSIBILITIES: HOUSEKEEPING
ADLS_ACUITY_SCORE: 18
ADLS_ACUITY_SCORE: 18

## 2019-10-23 NOTE — PROGRESS NOTES
"[facility]    Hepatology Follow-up    CC: hematemesis    Dx: MWT    Alcoholic cirrhosis   C.diff colitis   Chronic pancreatitis    24 hour events:   Still having multiple BMs daily    Subjective:  Had 7 BMs today, but states that it is getting firmer.  Also complains of left sided abdominal pain  Patient denies fevers, sweats or chills.    Medications  Current Facility-Administered Medications   Medication Dose Route Frequency     amylase-lipase-protease  36,000 Units Oral TID w/meals     escitalopram  10 mg Oral Daily     fluticasone  1 puff Inhalation Daily     folic acid  1 mg Oral Daily     gabapentin  300 mg Oral At Bedtime     influenza vaccine adult (product based on age)  0.5 mL Intramuscular Prior to discharge     insulin glargine  4 Units Subcutaneous At Bedtime     mirtazapine  30 mg Oral At Bedtime     multivitamin w/minerals  1 tablet Oral Daily     pantoprazole  40 mg Oral BID AC     QUEtiapine  600 mg Oral At Bedtime     sodium chloride (PF)  3 mL Intracatheter Q8H     vancomycin  125 mg Oral 4x Daily     vitamin B1  100 mg Oral Daily       Review of systems  A 10-point review of systems was negative, unless stated above    Examination  /72 (BP Location: Left arm)   Pulse 112   Temp 97.5  F (36.4  C) (Oral)   Resp 18   Ht 1.778 m (5' 10\")   Wt 86.1 kg (189 lb 14.4 oz)   SpO2 95%   BMI 27.25 kg/m      Intake/Output Summary (Last 24 hours) at 10/23/2019 1304  Last data filed at 10/23/2019 0659  Gross per 24 hour   Intake 2499.25 ml   Output --   Net 2499.25 ml       General: Looks well, NAD   CVS: RRR  Resp: Unlabored  Abdomen: Distended, mild LLQ,LUQ tenderness  Extremities: no edema  Neuro: AAO X 3,     Laboratory  Lab Results   Component Value Date     10/23/2019    POTASSIUM 3.8 10/23/2019    CHLORIDE 107 10/23/2019    CO2 25 10/23/2019    BUN 2 10/23/2019    CR 0.34 10/23/2019       Lab Results   Component Value Date    BILITOTAL 2.4 10/23/2019    ALT " 37 10/23/2019     10/23/2019    ALKPHOS 277 10/23/2019       Lab Results   Component Value Date    WBC 4.3 10/23/2019    HGB 8.9 10/23/2019     10/23/2019    PLT 96 10/23/2019       Lab Results   Component Value Date    INR 1.37 10/20/2019       MELD-Na score: 14 at 10/22/2019  6:32 AM  MELD score: 14 at 10/22/2019  6:32 AM  Calculated from:  Serum Creatinine: 0.41 mg/dL (Rounded to 1 mg/dL) at 10/22/2019  6:32 AM  Serum Sodium: 139 mmol/L (Rounded to 137 mmol/L) at 10/22/2019  6:32 AM  Total Bilirubin: 2.7 mg/dL at 10/22/2019  6:32 AM  INR(ratio): 1.37 at 10/20/2019  4:05 PM  Age: 54 years      Assessment  54 year old male with AUD, chronic pancreatitis, pancreatic insufficiency and splenic venous thrombosis; and new alcoholic cirrhosis, admitted with UGIB (EGD with MWT and GoV) and found to have CDI. He is currently on PO Vancomycin.  He has remained stable but with prior evidence of active withdrawal that is improving. He also complains of left sided abdominal pain. This is most likely related to colitis. CT on admission unremarkable. Will get USS with dopplers.    Recommendations  -- Continue PO PPI 40 mg BID  -- Continue PO Vancomycin 125 mg QID for 10 days  -- Monitor stool output  -- Monitor Hgb and transfuse if hgb <7  -- Trend LFTs  -- Please obtain complete abdominal USS with dopplers   Diagnostic paracentesis if able  -- Alcohol cessation encouragement   Chem Dep consult  -- Recommend Folate and Thiamine supplementation    Patient seen and discussed with attending physician, Dr. Leventhal Chimaobi Anugwom, MD  PGY 5  Hepatology

## 2019-10-23 NOTE — PROVIDER NOTIFICATION
High. G9. 5230. 5B. C.D. Re: critical value. Please call JOHANNA. Soledad SPENCER. [phone]. [phone].    Text page sent to [place] Cover MD. [phone]    Phone call received from Dr. Alejandro. Critical phosphorus level of 0.5. Informed MD that I have requested potassium phosphate  25 mmol from pharmacy and will hang it as soon as available. RN will order a recheck level of phosphorus 2 hours after replacement has finished infusing.

## 2019-10-23 NOTE — PROVIDER NOTIFICATION
Paged [phone]    High critical value, please call back. Unit 5b, room 230, CD  Pt's LA dropped to 2.4. Any interventions at this time?

## 2019-10-23 NOTE — PROGRESS NOTES
"   10/23/19 0937   Quick Adds   Type of Visit Initial Occupational Therapy Evaluation   Living Environment   Lives With alone   Living Arrangements apartment   Home Accessibility no concerns  (Elevator building)   Transportation Anticipated family or friend will provide   Living Environment Comment Patient lives alone, however, reports cousin assists as needed with cooking, cleaning and driving. Does not work, but enjoys playing card games for leisure.   Self-Care   Usual Activity Tolerance moderate   Current Activity Tolerance poor   Regular Exercise No   Equipment Currently Used at Home none   Functional Level   Ambulation 0-->independent   Transferring 0-->independent   Toileting 0-->independent   Bathing 0-->independent   Dressing 0-->independent   Eating 0-->independent   Communication 0-->understands/communicates without difficulty   Swallowing 0-->swallows foods/liquids without difficulty   Cognition 0 - no cognition issues reported   Fall history within last six months yes   Number of times patient has fallen within last six months 15   Which of the above functional risks had a recent onset or change? ambulation;transferring;fall history       Present no   General Information   Onset of Illness/Injury or Date of Surgery - Date 10/21/19   Referring Physician Brandon Rosales MD   Patient/Family Goals Statement To go home when he feels better   Additional Occupational Profile Info/Pertinent History of Current Problem \"Bridgette Mena is a 54 year old male admitted on 10/20/2019. He has a history of DM, asthma, seizures, polysubstance abuse, recurrent pancreatitis and ongoing alcohol use (patient denies but BAL .24 on admission) who presents with bloody diarrhea and hepatitis.\"   Precautions/Limitations fall precautions   Cognitive Status Examination   Orientation orientation to person, place and time   Visual Perception   Visual Perception Wears glasses  (For reading purposes)   Sensory " Examination   Sensory Quick Adds No deficits were identified   Pain Assessment   Patient Currently in Pain Yes, see Vital Sign flowsheet  (Abdominal pain)   Range of Motion (ROM)   ROM Quick Adds No deficits were identified   Strength   Manual Muscle Testing Quick Adds   (Generalized weakness)   Hand Strength   Hand Strength Comments WFL   Coordination   Coordination Comments WFL   Mobility   Bed Mobility Bed mobility skill: Supine to sit   Bed Mobility Skill: Supine to Sit   Level of [place]: Supine/Sit contact guard   Physical Assist/Nonphysical Assist: Supine/Sit 1 person assist;verbal cues   Transfer Skill: Toilet Transfer   Level of [place]: Toilet contact guard   Physical Assist/Nonphysical Assist: Toilet 1 person assist;verbal cues   Instrumental Activities of Daily Living (IADL)   Previous Responsibilities housekeeping   Activities of Daily Living Analysis   Impairments Contributing to Impaired Activities of Daily Living strength decreased   General Therapy Interventions   Planned Therapy Interventions ADL retraining;strengthening;transfer training;home program guidelines;progressive activity/exercise   Clinical Impression   Criteria for Skilled Therapeutic Interventions Met yes, treatment indicated   OT Diagnosis Patient presented to OT with decreased independence in ADL's.   Influenced by the following impairments Strength, endurance, balance.   Assessment of Occupational Performance 3-5 Performance Deficits   Identified Performance Deficits Dressing, bathing, toileting, transferring, ambulating   Clinical Decision Making (Complexity) Low complexity   Therapy Frequency 5x/week   Predicted Duration of Therapy Intervention (days/wks) 1 week   Anticipated Discharge Disposition Transitional Care Facility   Risks and Benefits of Treatment have been explained. Yes   Patient, Family & other staff in agreement with plan of care Yes   Total Evaluation Time   Total Evaluation Time (Minutes) 5

## 2019-10-23 NOTE — PLAN OF CARE
Discharge Planner OT   Patient plan for discharge: Home.  Current status: Initial evaluation and treatment session completed. Therapy limited this morning due to frequent toileting needs for loose stools, complaints of nausea and abdominal discomfort. Patient pleasant and agreeable with this therapist. CGA required for in room (short distance mobility), toilet transfers and bed mobility. HR at 147bpm following light activity, BP at 130/88 and O2 at 98% on room air.  Barriers to return to prior living situation: Fall history, weakness, endurance.  Recommendations for discharge: Anticipate need for TCU.  Rationale for recommendations: Given 10-15 fall history within the last few months as well as generalized deconditioning and lack of social support, TCU is most likely needed. OT will follow while in hospital to progress functional independence.       Entered by: Charito Dawson 10/23/2019 9:33 AM

## 2019-10-23 NOTE — PROGRESS NOTES
[facility], [facility] Progress Note - Hospitalist Service, Gold 9       Date of Admission:  10/20/2019  Assessment & Plan   Bridgette Mena is a 54 year old male admitted on 10/20/2019. He has a history of DM, asthma, seizures, polysubstance abuse, recurrent pancreatitis and ongoing alcohol use (patient denies but BAL .24 on admission) who presented with bloody diarrhea and hepatitis.       # Improving Abdominal Pain today  Now localized to LLQ. Abdomen is still quite distended, but soft, and tender only in LLQ  - US complete with dopplers    #Tachycardia: sinus  This is likely physiologic from illness/dehydration/withdrawl.   - 1 L NS bolus  - continue maintenance IVF  - on CIWA    # Hematochezia and Melena   # Cdiff diarrhea   # Esophagitis on CT  # EGD with varices and portal hypertensive gastropathy  Presents with loose, oily stools mised with dark red blood ongoing for two weeks.  ~10 episodes daily with no associated fevers.  Notes significant associated bloating.  He has a history of alcohol hepatitis and ongoing drinking (patient denies but BAL elevated and he has a history or alcohol withdrawal). s/p octreotide s/p flagyl when he was NPO. EGD with varices and portal hypertensive gastropathy  - C diff positive, on PO vanc  - Continue PPI orally  - Hgb daily with ongoing diarrhea  - GI consulted and recs appreciated     # Alcoholic hepatitis,   # Alcohol abuse/dependence  Patient with long history of polysubstance abuse who is not forthcoming regarding current alcohol use.  AST/ALT consistent with alcoholic hepatitis.  He had an extensive work up at [facility] on 5/10/2019 admission with negative anti smooth, PAULA < 1:80, negative Hep B, positive Hep C antibody but undetectable hep C via PCR.  - GI consulted as above  - IVF, CIWA  - CD consult when awake enough   - low phos in setting of likely liver injury, replacing aggressively  - will discuss chem dep tomorrow     #  Question of PNA  - Patchy GGO on abd CT, unclear if cough, afebrile and without leukocytosis. Could be aspiration pneumonitis  - Will monitor off abx     # History of DM II  # Hypoglycemia   - Will hold off home lantus 18 units QHS  - QID blood sugars  - Currently on D5     # History of asthma  - Continue home flovent, albuterol    # Bipolar d/o  - Cont home Seroquel, Remeron   - Unclear if taking Lexapro    # Somnolence  Likely 2/2 acute illness, medications and EtOH use. CTH negative. Ammonia 89, but no asterixis on exam, UDS positive only for EtOH and monitor, but improved some 10/22    - Hold gabapentin   - low threshold to start lactulose      Diet: Snacks/Supplements Adult: Boost Glucose Control; Between Meals  Snacks/Supplements Adult: Other; 10:00 am: Yogurt,,,,,,2:00 pm: string cheese x2 with crackers,,,,HS: cottage cheese/canned peaches; Between Meals  NPO per Anesthesia Guidelines for Procedure/Surgery Except for: Meds    DVT Prophylaxis: Pneumatic Compression Devices  Garcia Catheter: not present  Code Status: Full Code      Disposition Plan   Expected discharge: 4 - 7 days, recommended to prior living arrangement once antibiotic plan established, hemoglobin stable and mental status at baseline.  Entered: Marjorie Cruz MD 10/23/2019, 5:12 PM       The patient's care was discussed with the Bedside Nurse and Care Coordinator/.    Marjorie Cruz MD  Hospitalist Service, [address], [place]  Pager: [phone]  Please see sticky note for cross cover information  ______________________________________________________________________    Interval History   Up and about today.  But states that he gets dizzy with walking.  All over abdominal pain is improved, but LLQ tenderness is still present, similar intensity.  He also notes that he is still coughing up gunk.      Data reviewed today: I reviewed all medications, new labs and imaging results over the  last 24 hours. I personally reviewed the EKG tracing showing sinus tachycardia .    Physical Exam   Vital Signs: Temp: 97.5  F (36.4  C) Temp src: Oral BP: (!) 153/97 Pulse: 112 Heart Rate: 118 Resp: 20 SpO2: 97 % O2 Device: None (Room air)    Weight: 183 lbs 8 oz  Constitutional: Somnolent but arousable, no apparent distress  Respiratory: CTAB, anteriorly   Cardiovascular: Tachycardic but regular, no edema   GI: Slightly distended, mild TTP, soft   Skin/Integumen: No rashes, no cyanosis    Neuro: oriented to person and place, and knows why he is here, but not in detail       Data   Recent Labs   Lab 10/23/19  0545 10/22/19  1921 10/22/19  0632  10/21/19  1147 10/21/19  0617  10/20/19  1605   WBC 4.3 5.3 4.0   < > 5.8 5.6   < > 9.2   HGB 8.9* 9.9* 9.2*   < > 8.5* 8.7*   < > 11.2*   * 114* 112*   < > 114* 116*   < > 109*   PLT 96* 106* 94*   < > 82* 81*   < > 124*   INR  --   --   --   --   --   --   --  1.37*     --  139  --  136  --    < > 135   POTASSIUM 3.8  --  3.7  --  4.1  --    < > 3.2*   CHLORIDE 107  --  106  --  101  --    < > 98   CO2 25  --  25  --  19*  --    < > 21   BUN 2*  --  2*  --  4*  --    < > 6*   CR 0.34*  --  0.41*  --  0.39*  --    < > 0.37*   ANIONGAP 7  --  7  --  16*  --    < > 17*   NATALIE 7.8*  --  7.9*  --  7.6*  --    < > 8.2*   *  --  181*  --  166*  --    < > 69*   ALBUMIN 2.2*  --  2.4*  --  2.2* 2.2*  --  2.7*   PROTTOTAL 6.6*  --  6.9  --  7.1 7.1  --  8.7   BILITOTAL 2.4*  --  2.7*  --  3.4* 2.9*  --  3.8*   ALKPHOS 277*  --  287*  --  351* 356*  --  448*   ALT 37  --  29  --  37 33  --  45   *  --  134*  --  151* 150*  --  185*   LIPASE  --   --   --   --   --   --   --  87   TROPI  --   --   --   --   --  <0.015  --   --     < > = values in this interval not displayed.

## 2019-10-23 NOTE — PLAN OF CARE
CIWA score is 0 for this shift. Complained of abdominal pain, oxycodone given and patient was able to sleep and appeared comfortable. MIVF infusing via PIV without problems. Phosphorus level critical at 0.5, MD was notified. The phosphorus replacement is infusing and should be completed about 0730. There is a timed lab draw for 0930 to check the phosphorus level. Cardiac rhythm is NSR. Continue with current plan of nursing care, and update MD with concerns as needed.

## 2019-10-23 NOTE — PLAN OF CARE
Care assumed [id number]. A&O. VSS, on room air. Sepsis alert- LA was 2.9. First recheck was 3, second recheck was 2.4 after 1 L normal saline bolus completed. MD contacted about further interventions. Maintenance fluid infusing. Phosphorus infused- recheck at 2300. Telemetry monitoring in place, tachycardic in 110's. CIWA score 0-1, pt reported mild nausea. Good appetite, ate 50% of dinner. Abdominal pain managed with repositioning and Tylenol. BG monitored. Up with sba. Urinates spontaneously. 1 BM this shift- diarrhea. Continue to monitor for s/s of sepsis.

## 2019-10-23 NOTE — PLAN OF CARE
A&O, VSS on room air. LS wheezy. Enteric isolation for positive c diff, taking PO vanco well. Magnesium low this AM, replaced with recheck drawn, awaiting results. Phosphorous low, replacement started 1100, recheck tomorrow AM. Telemetry monitoring showing sinus tachycardia with occasional PVCs, order discontinued this shift and monitoring removed. PIV x2 intact infusing D5LR with 20KCL at 75mL/hr and phosphorous replacement. Complaining of burning pain in LLQ of abdomen, oxycodone and zantac given x1 with decrease in pain. Diarrhea. Continue with POC.

## 2019-10-24 ENCOUNTER — APPOINTMENT (OUTPATIENT)
Dept: INTERVENTIONAL RADIOLOGY/VASCULAR | Facility: CLINIC | Age: 54
End: 2019-10-24
Attending: PHYSICIAN ASSISTANT
Payer: COMMERCIAL

## 2019-10-24 ENCOUNTER — APPOINTMENT (OUTPATIENT)
Dept: PHYSICAL THERAPY | Facility: CLINIC | Age: 54
End: 2019-10-24
Attending: INTERNAL MEDICINE
Payer: COMMERCIAL

## 2019-10-24 LAB
ALBUMIN FLD-MCNC: 1.2 G/DL
ALBUMIN SERPL-MCNC: 2.2 G/DL (ref 3.4–5)
ALP SERPL-CCNC: 301 U/L (ref 40–150)
ALT SERPL W P-5'-P-CCNC: 54 U/L (ref 0–70)
ANION GAP SERPL CALCULATED.3IONS-SCNC: 6 MMOL/L (ref 3–14)
APPEARANCE FLD: NORMAL
AST SERPL W P-5'-P-CCNC: 207 U/L (ref 0–45)
BASOPHILS NFR FLD MANUAL: 1 %
BILIRUB SERPL-MCNC: 2.9 MG/DL (ref 0.2–1.3)
BUN SERPL-MCNC: 1 MG/DL (ref 7–30)
CALCIUM SERPL-MCNC: 8 MG/DL (ref 8.5–10.1)
CHLORIDE SERPL-SCNC: 107 MMOL/L (ref 94–109)
CK SERPL-CCNC: 28 U/L (ref 30–300)
CO2 SERPL-SCNC: 25 MMOL/L (ref 20–32)
COLOR FLD: YELLOW
CREAT SERPL-MCNC: 0.37 MG/DL (ref 0.66–1.25)
ERYTHROCYTE [DISTWIDTH] IN BLOOD BY AUTOMATED COUNT: 14.7 % (ref 10–15)
GFR SERPL CREATININE-BSD FRML MDRD: >90 ML/MIN/{1.73_M2}
GLUCOSE BLDC GLUCOMTR-MCNC: 100 MG/DL (ref 70–99)
GLUCOSE BLDC GLUCOMTR-MCNC: 107 MG/DL (ref 70–99)
GLUCOSE BLDC GLUCOMTR-MCNC: 138 MG/DL (ref 70–99)
GLUCOSE BLDC GLUCOMTR-MCNC: 97 MG/DL (ref 70–99)
GLUCOSE SERPL-MCNC: 98 MG/DL (ref 70–99)
GRAM STN SPEC: NORMAL
HCT VFR BLD AUTO: 27.5 % (ref 40–53)
HGB BLD-MCNC: 8.8 G/DL (ref 13.3–17.7)
LACTATE BLD-SCNC: 1.4 MMOL/L (ref 0.7–2)
LYMPHOCYTES NFR FLD MANUAL: 11 %
MAGNESIUM SERPL-MCNC: 1.5 MG/DL (ref 1.6–2.3)
MAGNESIUM SERPL-MCNC: 2 MG/DL (ref 1.6–2.3)
MCH RBC QN AUTO: 35.8 PG (ref 26.5–33)
MCHC RBC AUTO-ENTMCNC: 32 G/DL (ref 31.5–36.5)
MCV RBC AUTO: 112 FL (ref 78–100)
NEUTS BAND NFR FLD MANUAL: 2 %
OTHER CELLS FLD MANUAL: 86 %
PHOSPHATE SERPL-MCNC: 2.8 MG/DL (ref 2.5–4.5)
PLATELET # BLD AUTO: 105 10E9/L (ref 150–450)
POTASSIUM SERPL-SCNC: 3.9 MMOL/L (ref 3.4–5.3)
PROT FLD-MCNC: 2.8 G/DL
PROT SERPL-MCNC: 6.8 G/DL (ref 6.8–8.8)
RBC # BLD AUTO: 2.46 10E12/L (ref 4.4–5.9)
SODIUM SERPL-SCNC: 138 MMOL/L (ref 133–144)
SPECIMEN SOURCE FLD: NORMAL
SPECIMEN SOURCE: NORMAL
WBC # BLD AUTO: 6.2 10E9/L (ref 4–11)
WBC # FLD AUTO: 214 /UL

## 2019-10-24 PROCEDURE — 83605 ASSAY OF LACTIC ACID: CPT

## 2019-10-24 PROCEDURE — 82550 ASSAY OF CK (CPK): CPT | Performed by: INTERNAL MEDICINE

## 2019-10-24 PROCEDURE — 87015 SPECIMEN INFECT AGNT CONCNTJ: CPT | Performed by: PEDIATRICS

## 2019-10-24 PROCEDURE — 25000128 H RX IP 250 OP 636: Performed by: NURSE PRACTITIONER

## 2019-10-24 PROCEDURE — 0W9G3ZZ DRAINAGE OF PERITONEAL CAVITY, PERCUTANEOUS APPROACH: ICD-10-PCS | Performed by: PHYSICIAN ASSISTANT

## 2019-10-24 PROCEDURE — 99233 SBSQ HOSP IP/OBS HIGH 50: CPT | Performed by: PEDIATRICS

## 2019-10-24 PROCEDURE — 97161 PT EVAL LOW COMPLEX 20 MIN: CPT | Mod: GP

## 2019-10-24 PROCEDURE — 82042 OTHER SOURCE ALBUMIN QUAN EA: CPT | Performed by: PEDIATRICS

## 2019-10-24 PROCEDURE — 25000128 H RX IP 250 OP 636: Performed by: PEDIATRICS

## 2019-10-24 PROCEDURE — 36415 COLL VENOUS BLD VENIPUNCTURE: CPT | Performed by: NURSE PRACTITIONER

## 2019-10-24 PROCEDURE — 87205 SMEAR GRAM STAIN: CPT | Performed by: PEDIATRICS

## 2019-10-24 PROCEDURE — 25000132 ZZH RX MED GY IP 250 OP 250 PS 637: Performed by: HOSPITALIST

## 2019-10-24 PROCEDURE — 84100 ASSAY OF PHOSPHORUS: CPT | Performed by: INTERNAL MEDICINE

## 2019-10-24 PROCEDURE — 85027 COMPLETE CBC AUTOMATED: CPT | Performed by: INTERNAL MEDICINE

## 2019-10-24 PROCEDURE — 90682 RIV4 VACC RECOMBINANT DNA IM: CPT | Performed by: INTERNAL MEDICINE

## 2019-10-24 PROCEDURE — 36415 COLL VENOUS BLD VENIPUNCTURE: CPT | Performed by: INTERNAL MEDICINE

## 2019-10-24 PROCEDURE — 89051 BODY FLUID CELL COUNT: CPT | Performed by: PEDIATRICS

## 2019-10-24 PROCEDURE — 25000132 ZZH RX MED GY IP 250 OP 250 PS 637: Performed by: NURSE PRACTITIONER

## 2019-10-24 PROCEDURE — 25000128 H RX IP 250 OP 636: Performed by: INTERNAL MEDICINE

## 2019-10-24 PROCEDURE — 00000146 ZZHCL STATISTIC GLUCOSE BY METER IP

## 2019-10-24 PROCEDURE — 87070 CULTURE OTHR SPECIMN AEROBIC: CPT | Performed by: PEDIATRICS

## 2019-10-24 PROCEDURE — 83735 ASSAY OF MAGNESIUM: CPT | Performed by: NURSE PRACTITIONER

## 2019-10-24 PROCEDURE — 83735 ASSAY OF MAGNESIUM: CPT | Performed by: INTERNAL MEDICINE

## 2019-10-24 PROCEDURE — 80053 COMPREHEN METABOLIC PANEL: CPT | Performed by: INTERNAL MEDICINE

## 2019-10-24 PROCEDURE — 27211039 IR PARACENTESIS

## 2019-10-24 PROCEDURE — 12000001 ZZH R&B MED SURG/OB UMMC

## 2019-10-24 PROCEDURE — 25000132 ZZH RX MED GY IP 250 OP 250 PS 637: Performed by: PEDIATRICS

## 2019-10-24 PROCEDURE — 84157 ASSAY OF PROTEIN OTHER: CPT | Performed by: PEDIATRICS

## 2019-10-24 RX ADMIN — FOLIC ACID 1 MG: 1 TABLET ORAL at 09:09

## 2019-10-24 RX ADMIN — PANCRELIPASE 36000 UNITS: 60000; 12000; 38000 CAPSULE, DELAYED RELEASE PELLETS ORAL at 18:28

## 2019-10-24 RX ADMIN — INFLUENZA A VIRUS A/BRISBANE/02/2018 (H1N1) RECOMBINANT HEMAGGLUTININ ANTIGEN, INFLUENZA A VIRUS A/KANSAS/14/2017 (H3N2) RECOMBINANT HEMAGGLUTININ ANTIGEN, INFLUENZA B VIRUS B/PHUKET/3073/2013 RECOMBINANT HEMAGGLUTININ ANTIGEN, AND INFLUENZA B VIRUS B/MARYLAND/15/2016 RECOMBINANT HEMAGGLUTININ ANTIGEN 0.5 ML: 45; 45; 45; 45 INJECTION INTRAMUSCULAR at 11:39

## 2019-10-24 RX ADMIN — MIRTAZAPINE 30 MG: 15 TABLET, FILM COATED ORAL at 21:31

## 2019-10-24 RX ADMIN — OXYCODONE HYDROCHLORIDE 5 MG: 5 TABLET ORAL at 20:26

## 2019-10-24 RX ADMIN — QUETIAPINE 600 MG: 300 TABLET, FILM COATED ORAL at 21:31

## 2019-10-24 RX ADMIN — VANCOMYCIN HYDROCHLORIDE 125 MG: KIT at 12:18

## 2019-10-24 RX ADMIN — OXYCODONE HYDROCHLORIDE 5 MG: 5 TABLET ORAL at 16:14

## 2019-10-24 RX ADMIN — POTASSIUM CHLORIDE AND SODIUM CHLORIDE: 900; 150 INJECTION, SOLUTION INTRAVENOUS at 07:07

## 2019-10-24 RX ADMIN — VANCOMYCIN HYDROCHLORIDE 125 MG: KIT at 20:26

## 2019-10-24 RX ADMIN — MAGNESIUM SULFATE HEPTAHYDRATE 4 G: 40 INJECTION, SOLUTION INTRAVENOUS at 14:07

## 2019-10-24 RX ADMIN — VANCOMYCIN HYDROCHLORIDE 125 MG: KIT at 15:58

## 2019-10-24 RX ADMIN — GABAPENTIN 300 MG: 300 CAPSULE ORAL at 21:31

## 2019-10-24 RX ADMIN — MULTIPLE VITAMINS W/ MINERALS TAB 1 TABLET: TAB at 09:08

## 2019-10-24 RX ADMIN — VANCOMYCIN HYDROCHLORIDE 125 MG: KIT at 09:09

## 2019-10-24 RX ADMIN — ESCITALOPRAM 10 MG: 5 TABLET, FILM COATED ORAL at 09:08

## 2019-10-24 RX ADMIN — PANTOPRAZOLE SODIUM 40 MG: 40 TABLET, DELAYED RELEASE ORAL at 15:58

## 2019-10-24 RX ADMIN — PANTOPRAZOLE SODIUM 40 MG: 40 TABLET, DELAYED RELEASE ORAL at 09:08

## 2019-10-24 RX ADMIN — FLUTICASONE FUROATE 1 PUFF: 200 POWDER RESPIRATORY (INHALATION) at 09:08

## 2019-10-24 RX ADMIN — PANCRELIPASE 36000 UNITS: 60000; 12000; 38000 CAPSULE, DELAYED RELEASE PELLETS ORAL at 09:07

## 2019-10-24 RX ADMIN — THIAMINE HCL TAB 100 MG 100 MG: 100 TAB at 09:08

## 2019-10-24 RX ADMIN — OXYCODONE HYDROCHLORIDE 5 MG: 5 TABLET ORAL at 10:40

## 2019-10-24 ASSESSMENT — ACTIVITIES OF DAILY LIVING (ADL)
ADLS_ACUITY_SCORE: 13

## 2019-10-24 ASSESSMENT — MIFFLIN-ST. JEOR: SCORE: 1686.31

## 2019-10-24 NOTE — PLAN OF CARE
Pt AOx4, VSS on RA except tachycardic. RPIV infusing NaCL + KCL 20mEq @ 75mL/hr. No c/o pain overnight. Wheezing on auscultation. Up ad prashant. Able to void spontaneously. CIWA 0 overnight. Able to use call light appropriately & make needs known. Continue to monitor & follow POC.

## 2019-10-24 NOTE — PLAN OF CARE
"5B  Discharge Planner PT   Patient plan for discharge: home.   Current status: Patient refusing to participate in much therapy until \"the doctor comes in here and tells me what's wrong.\" He performs bed mobility independently and demonstrates good upright balance. He reports pain at 9.5/10 in his abdomen.   Barriers to return to prior living situation: High fall history, limited weakness and endurance  Recommendations for discharge: Likely TCU  Rationale for recommendations: Per OT, patient has a high fall history with \"generalized deconditioning and lack of social support.\" Will likely need TCU based on limited mobility and safety concerns. Patient reports he is only willing to go to and from the commode.        Entered by: Cheyenne Diamond 10/24/2019 9:49 AM       "

## 2019-10-24 NOTE — PROGRESS NOTES
Patient Name: Bridgette Mena  Medical Record Number: [id number]  Today's Date: 10/24/2019    Procedure: paracentesis  Proceduralist: Felipe RUIZ    Procedure start time: 1525  Procedure end time: 1545    Report given to: 5B RN     Pt arrived to IR room 6 from 5-230. Consent reviewed. Pt denies any questions or concerns regarding procedure. Pt positioned supine and monitored per protocol. Pt tolerated procedure without any noted complications. 1600ml removed. Dressing applied. Pt transferred back to 5-230.    Labs sent as ordered

## 2019-10-24 NOTE — PLAN OF CARE
Care assumed [id number]. A&O. Tachycardic in 110-120s and intermittently hypertensive, other VSS on room air. LS wheezy- PRN albuterol given x1. PIV right arm infusing NS with 20 KCL at 75 mL/hr. PRN oxy x2 for LLQ abdominal pain. Administered vanco for positive c. Diff infection. Up independently, walker ordered. Urinates spontaneously. 1 BM this shift- diarrhea. Continue to monitor and notify MD with changes.

## 2019-10-24 NOTE — PLAN OF CARE
Tachycardic, MDs aware.  Abd pain controlled with oxycodone x 1. O x 4. 2 gm NA diet, good po intake. Voiding spontaneously, had 3 loose BMs. Up indep in room and halls. Mg currently infusing and then PIV can be SLd. CIWA- 0. Consult for IR to do possible Paracentesis.

## 2019-10-24 NOTE — PROGRESS NOTES
10/24/19 0938   Living Environment   Lives With alone   Living Arrangements apartment   Home Accessibility no concerns   Transportation Anticipated family or friend will provide   Living Environment Comment Per OT, patient lives alone but has family to assist as needed.   Functional Level Prior   Ambulation 0-->independent   Transferring 0-->independent   Toileting 0-->independent   Bathing 0-->independent   Fall history within last six months yes

## 2019-10-24 NOTE — CONSULTS
INTERVENTIONAL RADIOLOGY CONSULT    Bridgette Mena is a 54 year old male with abdominal distention found to have new ascites and history of alcohol and substance abuse. IR has been consulted for diagnostic paracentesis. Inpatient procedure team has deferred due to small window.    Patient is on IR schedule this afternoon for a diagnostic paracentesis. No NPO required. Consent will be done prior to procedure.     Earnest Levy PA-C  Interventional Radiology  [phone]

## 2019-10-24 NOTE — PROCEDURES
[facility], [place]    Procedure: IR PARACENTESIS  Date/Time: 10/24/2019 3:46 PM  Performed by: Cristi Levy PA-C  Authorized by: Cristi Levy PA-C     UNIVERSAL PROTOCOL   Site Marked: No  Prior Images Obtained and Reviewed:  Yes  Required items: Required blood products, implants, devices and special equipment available    Patient identity confirmed:  Verbally with patient, provided demographic data, hospital-assigned identification number and arm band  NA - No sedation, light sedation, or local anesthesia  Confirmation Checklist:  Patient's identity using two indicators, relevant allergies, procedure was appropriate and matched the consent or emergent situation and correct equipment/implants were available  Time out: Immediately prior to the procedure a time out was called    Preparation: Patient was prepped and draped in usual sterile fashion       ANESTHESIA    Anesthesia: Local infiltration  Local Anesthetic:  Lidocaine 1% without epinephrine  Anesthetic Total (mL):  6      SEDATION    Patient Sedated: No    See dictated procedure note for full details.  Findings: Tolerated local well    Specimens: fluid and/or tissue for laboratory analysis and culture    Complications: None    Condition: Stable    Plan: Follow-up per primary team. Lab results pending.    PROCEDURE   Patient Tolerance:  Patient tolerated the procedure well with no immediate complications  Describe Procedure: Completed ultrasound-guided diagnostic and therapeutic paracentesis. A total of 1.6 L clear yellow fluid drained from the abdomen. Marked hepatomegaly.  Time of Sedation in Minutes by Physician:  0

## 2019-10-25 ENCOUNTER — APPOINTMENT (OUTPATIENT)
Dept: PHYSICAL THERAPY | Facility: CLINIC | Age: 54
End: 2019-10-25
Payer: COMMERCIAL

## 2019-10-25 VITALS
BODY MASS INDEX: 26.51 KG/M2 | WEIGHT: 185.2 LBS | TEMPERATURE: 98 F | RESPIRATION RATE: 16 BRPM | HEART RATE: 129 BPM | HEIGHT: 70 IN | SYSTOLIC BLOOD PRESSURE: 129 MMHG | OXYGEN SATURATION: 95 % | DIASTOLIC BLOOD PRESSURE: 80 MMHG

## 2019-10-25 LAB
GLUCOSE BLDC GLUCOMTR-MCNC: 81 MG/DL (ref 70–99)
HBA1C MFR BLD: 4.3 % (ref 0–5.6)

## 2019-10-25 PROCEDURE — 97116 GAIT TRAINING THERAPY: CPT | Mod: GP | Performed by: PHYSICAL THERAPIST

## 2019-10-25 PROCEDURE — 83036 HEMOGLOBIN GLYCOSYLATED A1C: CPT | Performed by: HOSPITALIST

## 2019-10-25 PROCEDURE — 99239 HOSP IP/OBS DSCHRG MGMT >30: CPT | Performed by: HOSPITALIST

## 2019-10-25 PROCEDURE — 25000132 ZZH RX MED GY IP 250 OP 250 PS 637: Performed by: HOSPITALIST

## 2019-10-25 PROCEDURE — 97530 THERAPEUTIC ACTIVITIES: CPT | Mod: GP | Performed by: PHYSICAL THERAPIST

## 2019-10-25 PROCEDURE — 25000132 ZZH RX MED GY IP 250 OP 250 PS 637: Performed by: NURSE PRACTITIONER

## 2019-10-25 PROCEDURE — 36415 COLL VENOUS BLD VENIPUNCTURE: CPT | Performed by: HOSPITALIST

## 2019-10-25 PROCEDURE — 00000146 ZZHCL STATISTIC GLUCOSE BY METER IP

## 2019-10-25 RX ORDER — PANTOPRAZOLE SODIUM 40 MG/1
40 TABLET, DELAYED RELEASE ORAL
Status: DISCONTINUED | OUTPATIENT
Start: 2019-10-25 | End: 2019-10-25 | Stop reason: HOSPADM

## 2019-10-25 RX ORDER — NICOTINE POLACRILEX 4 MG
15-30 LOZENGE BUCCAL
Status: DISCONTINUED | OUTPATIENT
Start: 2019-10-25 | End: 2019-10-25 | Stop reason: HOSPADM

## 2019-10-25 RX ORDER — VANCOMYCIN HYDROCHLORIDE 50 MG/ML
125 KIT ORAL 4 TIMES DAILY
Qty: 60 ML | Refills: 0 | Status: SHIPPED | OUTPATIENT
Start: 2019-10-25 | End: 2019-10-31

## 2019-10-25 RX ORDER — MAGNESIUM OXIDE 400 MG/1
400 TABLET ORAL DAILY
Status: DISCONTINUED | OUTPATIENT
Start: 2019-10-25 | End: 2019-10-25 | Stop reason: HOSPADM

## 2019-10-25 RX ORDER — QUETIAPINE FUMARATE 300 MG/1
600 TABLET, FILM COATED ORAL AT BEDTIME
Status: DISCONTINUED | OUTPATIENT
Start: 2019-10-25 | End: 2019-10-25 | Stop reason: HOSPADM

## 2019-10-25 RX ORDER — ALBUTEROL SULFATE 90 UG/1
1-2 AEROSOL, METERED RESPIRATORY (INHALATION) EVERY 4 HOURS PRN
Status: DISCONTINUED | OUTPATIENT
Start: 2019-10-25 | End: 2019-10-25 | Stop reason: HOSPADM

## 2019-10-25 RX ORDER — MIRTAZAPINE 15 MG/1
30 TABLET, FILM COATED ORAL AT BEDTIME
Status: DISCONTINUED | OUTPATIENT
Start: 2019-10-25 | End: 2019-10-25 | Stop reason: HOSPADM

## 2019-10-25 RX ORDER — ESCITALOPRAM OXALATE 5 MG/1
10 TABLET ORAL DAILY
Status: DISCONTINUED | OUTPATIENT
Start: 2019-10-25 | End: 2019-10-25 | Stop reason: HOSPADM

## 2019-10-25 RX ORDER — CALCIUM CARBONATE 500 MG/1
500 TABLET, CHEWABLE ORAL DAILY PRN
Status: DISCONTINUED | OUTPATIENT
Start: 2019-10-25 | End: 2019-10-25 | Stop reason: HOSPADM

## 2019-10-25 RX ORDER — VANCOMYCIN HYDROCHLORIDE 50 MG/ML
125 KIT ORAL 4 TIMES DAILY
Status: DISCONTINUED | OUTPATIENT
Start: 2019-10-25 | End: 2019-10-25 | Stop reason: HOSPADM

## 2019-10-25 RX ORDER — DEXTROSE MONOHYDRATE 25 G/50ML
25-50 INJECTION, SOLUTION INTRAVENOUS
Status: DISCONTINUED | OUTPATIENT
Start: 2019-10-25 | End: 2019-10-25 | Stop reason: HOSPADM

## 2019-10-25 RX ORDER — GABAPENTIN 300 MG/1
300 CAPSULE ORAL AT BEDTIME
Status: DISCONTINUED | OUTPATIENT
Start: 2019-10-25 | End: 2019-10-25 | Stop reason: HOSPADM

## 2019-10-25 RX ORDER — ALBUMIN (HUMAN) 12.5 G/50ML
12.5-5 SOLUTION INTRAVENOUS CONTINUOUS PRN
Status: DISCONTINUED | OUTPATIENT
Start: 2019-10-25 | End: 2019-10-25 | Stop reason: HOSPADM

## 2019-10-25 RX ADMIN — VANCOMYCIN HYDROCHLORIDE 125 MG: KIT at 12:17

## 2019-10-25 RX ADMIN — PANCRELIPASE 36000 UNITS: 60000; 12000; 38000 CAPSULE, DELAYED RELEASE PELLETS ORAL at 10:47

## 2019-10-25 RX ADMIN — ESCITALOPRAM 10 MG: 5 TABLET, FILM COATED ORAL at 10:47

## 2019-10-25 RX ADMIN — PANTOPRAZOLE SODIUM 40 MG: 40 TABLET, DELAYED RELEASE ORAL at 10:48

## 2019-10-25 RX ADMIN — MAGNESIUM OXIDE TAB 400 MG (241.3 MG ELEMENTAL MG) 400 MG: 400 (241.3 MG) TAB at 10:47

## 2019-10-25 RX ADMIN — THIAMINE HCL TAB 100 MG 100 MG: 100 TAB at 10:48

## 2019-10-25 ASSESSMENT — ACTIVITIES OF DAILY LIVING (ADL)
ADLS_ACUITY_SCORE: 14
ADLS_ACUITY_SCORE: 13

## 2019-10-25 NOTE — PLAN OF CARE
Occupational Therapy Discharge Summary    Reason for therapy discharge:    Discharged to home.    Progress towards therapy goal(s). See goals on Care Plan in [facility] electronic health record for goal details.  Goals partially met.  Barriers to achieving goals:   discharge from facility.    Therapy recommendation(s):    Continued therapy is recommended.  Rationale/Recommendations:  Recommending further OT services to maximize safety and independence in ADL/IADLs .

## 2019-10-25 NOTE — PROVIDER NOTIFICATION
Paged [phone]    Pt A&O during day, but altered mental status/disorientation to situation after taking bedtime meds.

## 2019-10-25 NOTE — PLAN OF CARE
Pt A & O, VSS on RA. CIWA 0. No c/o pain overnight. No BM. Able to void spontaneously. RPIV S.L, CDI. Able to use call light appropriately & make needs known. Will continue to monitor & follow POC.

## 2019-10-25 NOTE — PLAN OF CARE
"Assumed cares 0700 until discharge.     /80 (BP Location: Left arm)   Pulse 129   Temp 98  F (36.7  C) (Oral)   Resp 16   Ht 1.778 m (5' 10\")   Wt 84 kg (185 lb 3.2 oz)   SpO2 95%   BMI 26.57 kg/m       Pain: Reported in LLQ of abd.    Neuro: A and O x 4. CIWA of 0 this shift.    Respiratory: Lung sounds clear and equal on RA.    Cardiac/Neurovascular: HR elevated. Pulses WDL. Trace LE edema. Numbness/tingling in LEs.    GI/: Bowel sounds active. Abd distended and taut. LLQ of abd tender w/ and w/o palpation. Loose stools continue.    Nutrition: Ate 75% breakfast. BG 81 before breakfast.    Activity: Up ind.    Skin: No concerns.    Lines: Removed prior to discharge.    Events this shift: Pt medically ready for discharge. Reviewed discharge instructions with pt and pt verbalized understanding.      Plan: Pt left unit around 1140. He is aware of necessary follow up and that he has vancomycin to  on his way out of hospital (at discharge pharmacy). Pt stated he plans on calling himself a cab to get home.    "

## 2019-10-25 NOTE — DISCHARGE SUMMARY
Inpatient Discharge Summary    Date of Admission: 10/20/2019  Date of Discharge: 10/25/2019    Discharge Diagnosis:   1. C.diff  2.  Upper GI bleed  3.  Portal Hypertensive Gastropathy  4.  Esophageal Varices  5.  Alcohol dependence  6.  Acute toxic hepatitis    Procedures During Hospitalization:   1. Upper endoscopy performed on 10/21 demonstrated Portal hypertensive gastropathy and esophageal varices  2. Paracentesis performed on 10/24 with removal of 1.6  L of ascites fluid    Consulting Services:   1.  Gastroenterology  2.  Interventional radiology    History of Present Illness: Bridgette Mena is a 54 year old male admitted on 10/20/2019. He has a history of DM, asthma, seizures, polysubstance abuse, recurrent pancreatitis and ongoing alcohol use (patient denies but BAL .24 on admission) who presented with bloody diarrhea and hepatitis.    Hospital Course:   1.  Upper GI bleed: Patient was admitted with concern for melena and hematochezia.  During his hospital stay his hemoglobins were stable and he did not require transfusion.  He underwent upper endoscopy demonstrating small esophageal varices as well as portal hypertensive gastropathy.  He was discharged on oral ppi. He had no evidence of GI Bleeding at the time of discharge.  2. C. Diff  Infection: The patient is admitted with diarrhea.  C. difficile returned positive.  He was started on oral vancomycin with improvement in his stool number and was discharged to complete a full course of oral vancomycin.  3.  Acute hepatitis: This was thought secondary to alcohol consumption.  Patient had a extensive work-up for other causes of hepatitis just a few months ago at [facility] and was all negative.  His LFTs, at the time of discharge were still elevated but were stable.  4. Alcohol Dependence: admitted with elevated JL. Denies significant alcohol use, uninterested in seeing Chem Dep. Discussed importance of seeing them and abstaining from alcohol.  5.  Likely Portal  Hypertension from cirrhosis: No evidence on ultrasound but has thrombocytopenia, ascites, other sequale of this. Decided to defer diuretics at this point as unsure if he will re- accumulate ascites - discussed low salt diet with the patient which he was in agreement to.    Discharge Physical Exam:   B/P: 129/80, T: 98, P: 129, R: 16  GEN: Pleasant, in NAD  CHEST: CTA B  CV: RRR  ABD: soft, nt/nd  EXT: no edema    Discharge Medications:      Review of your medicines      START taking      Dose / Directions   vancomycin 50 MG/ML oral solution  Commonly known as:  FIRVANQ  Indication:  Clostridium difficile Bacteria  Used for:  Diarrhea of infectious origin      Dose:  125 mg  Take 2.5 mLs (125 mg) by mouth 4 times daily for 6 days  Quantity:  60 mL  Refills:  0        CONTINUE these medicines which have NOT CHANGED      Dose / Directions   albuterol 108 (90 Base) MCG/ACT inhaler  Commonly known as:  PROAIR HFA/PROVENTIL HFA/VENTOLIN HFA      Dose:  1-2 puff  Inhale 1-2 puffs into the lungs  Refills:  0     amylase-lipase-protease 45986 units Cpep  Commonly known as:  CREON 12      Dose:  36,000 Units  Take 36,000 Units by mouth  Refills:  0     calcium carbonate 500 MG chewable tablet  Commonly known as:  TUMS      Dose:  500 mg  Take 500 mg by mouth  Refills:  0     escitalopram 10 MG tablet  Commonly known as:  LEXAPRO      Dose:  10 mg  Take 10 mg by mouth  Refills:  0     fluticasone 110 MCG/ACT inhaler  Commonly known as:  FLOVENT HFA      Dose:  2 puff  Inhale 2 puffs into the lungs  Refills:  0     fluticasone 50 MCG/ACT nasal spray  Commonly known as:  FLONASE      Dose:  1 spray  Spray 1 spray in nostril  Refills:  0     folic acid 1 MG tablet  Commonly known as:  FOLVITE      Dose:  1 mg  Take 1 mg by mouth  Refills:  0     gabapentin 300 MG capsule  Commonly known as:  NEURONTIN      Dose:  300 mg  Take 300 mg by mouth  Refills:  0     loratadine 10 MG tablet  Commonly known as:  CLARITIN      Dose:  10  mg  Take 10 mg by mouth  Refills:  0     magnesium oxide 400 (241.3 Mg) MG tablet  Commonly known as:  MAG-OX      Dose:  400 mg  Take 400 mg by mouth  Refills:  0     mirtazapine 30 MG tablet  Commonly known as:  REMERON      Dose:  30 mg  Take 30 mg by mouth  Refills:  0     pantoprazole 40 MG EC tablet  Commonly known as:  PROTONIX      Dose:  40 mg  Take 40 mg by mouth  Refills:  0     QUEtiapine 300 MG tablet  Commonly known as:  SEROquel      Dose:  600 mg  Take 600 mg by mouth  Refills:  0     ranitidine 150 MG tablet  Commonly known as:  ZANTAC      Dose:  150 mg  Take 150 mg by mouth  Refills:  0     vitamin D2 40018 units (1250 mcg) capsule  Commonly known as:  ERGOCALCIFEROL      Dose:  50,000 Units  Take 50,000 Units by mouth  Refills:  0        STOP taking    insulin glargine 100 UNIT/ML pen  Commonly known as:  LANTUS PEN        ondansetron 4 MG ODT tab  Commonly known as:  ZOFRAN ODT              Where to get your medicines      These medications were sent to [facility] - [address] [address]    Phone:  [phone]     vancomycin 50 MG/ML oral solution         Discharge Follow-up:   1. Follow-up with your PCP at [facility] to follow-up this hospital stay  2. Follow-up with Hepatology at the [facility] per their schedule - likely in 1-2 months    Greater than 30 minutes was spent in discharge planning and coordination    Dr. Jethro Alegria MD MPH  Internal Medicine and Pediatric Hospitalist  [phone]

## 2019-10-25 NOTE — PLAN OF CARE
Care assumed [id number]. A&O during the day, confused and disoriented to situation after taking bedtime medications- MD notified. Tachycardic in 110-120s, other VSS. Triggered sepsis- LA was 1.4. Magnesium recheck was 2. CIWA score 0. Paracentesis in IR done today. PIV right arm saline locked. Pt reports abdominal pain in LLQ, oxy given x2. Up ad prashant during day, BA after night time meds. Good appetite. Urinates spontaneously. Pt reports no BM this shift. Continue to monitor and notify MD with changes.

## 2019-10-25 NOTE — PLAN OF CARE
PT 5B    Discharge Planner PT   Patient plan for discharge: home  Current status: patient transferred OOB independently.  Ambulated > 200' independently.  Performed stairs with rail mod independent.   Barriers to return to prior living situation: medical status  Recommendations for discharge: home  Rationale for recommendations: patient independent with functional mobility.     Physical Therapy Discharge Summary    Reason for therapy discharge:    All goals and outcomes met, no further needs identified.    Progress towards therapy goal(s). See goals on Care Plan in [facility] electronic health record for goal details.  Goals met    Therapy recommendation(s):    No further therapy is recommended.           Entered by: Jose Villatoro 10/25/2019 10:19 AM

## 2019-10-25 NOTE — PROGRESS NOTES
[facility], [facility] Progress Note - Hospitalist Service, Gold 9       Date of Admission:  10/20/2019  Assessment & Plan   Bridgette Mena is a 54 year old male admitted on 10/20/2019. He has a history of DM, asthma, seizures, polysubstance abuse, recurrent pancreatitis and ongoing alcohol use (patient denies but BAL .24 on admission) who presented with bloody diarrhea and hepatitis, now with new decompensated cirrhosis, c. Diff and found to have a evert-brannon tear.      # Abdominal Pain unchanged  Now localized to LLQ. Abdomen is still quite distended, even post Paracentesis, but soft, and tender throughout, more so in the LLQ. Likely from C. Diff. Vs. SBP  - continue treatment as below  -  Para with labs sent and pending    #Tachycardia: sinus  This is likely physiologic from illness/dehydration/withdrawl.   - stopped maintenance IVF as consistent, patient eating and drinking well  - on CIWA    # Hematochezia and Melena   # Cdiff diarrhea   # Esophagitis on CT  # EGD with varices and portal hypertensive gastropathy  Presents with loose, oily stools mised with dark red blood ongoing for two weeks.  ~10 episodes daily with no associated fevers.  Notes significant associated bloating.  He has a history of alcohol hepatitis and ongoing drinking (patient denies but BAL elevated and he has a history or alcohol withdrawal). s/p octreotide s/p flagyl when he was NPO. EGD with varices and portal hypertensive gastropathy  - C diff positive, on PO vanc diarrhea ? Slowing? Consider restarting metronidazole 10/25  - Continue PPI orally  - Hgb daily with ongoing diarrhea  - GI consulted and recs appreciated     # Alcoholic hepatitis,   # Alcohol abuse/dependence  Patient with long history of polysubstance abuse who is not forthcoming regarding current alcohol use.  AST/ALT consistent with alcoholic hepatitis.  He had an extensive work up at [facility] on 5/10/2019 admission with negative anti  smooth, PAULA < 1:80, negative Hep B, positive Hep C antibody but undetectable hep C via PCR. LFTs rising.  - GI consulted as above  - IVF, CIWA  - CD consult when awake enough   - low phos in setting of likely liver injury, replacing aggressively  - discussed chem dep today, patient not engaging in conversation states that his alcohol use is not that much and not a problem, to readdress 10/25  - patient will need ongoing education about the extent of his liver disease     # Question of PNA  - Patchy GGO on abd CT, unclear if cough, afebrile and without leukocytosis. Could be aspiration pneumonitis  - Will monitor off abx     # History of DM II  # Hypoglycemia   - Will hold off home lantus 18 units QHS  - QID blood sugars  - Currently on D5     # History of asthma  - Continue home flovent, albuterol    # Bipolar d/o  - Cont home Seroquel, Remeron   - Unclear if taking Lexapro    # Somnolence  Likely 2/2 acute illness, medications and EtOH use. CTH negative. Ammonia 89, but no asterixis on exam, UDS positive only for EtOH and monitor, but improved some 10/22    - Hold gabapentin   - low threshold to start lactulose      Diet: Snacks/Supplements Adult: Magic Cup; Between Meals  2 Gram Sodium Diet  Snacks/Supplements Adult: Other; 10:00 am: Yogurt,,,,,,2:00 pm: string cheese x2 with crackers,,,,HS: peanut butter / crackers / canned peaches; Between Meals  Snacks/Supplements Adult: Magic Cup; With Meals    Severe malnutrition in the context of acute on chronic condition  DVT Prophylaxis: Pneumatic Compression Devices  Garcia Catheter: not present  Code Status: Full Code      Disposition Plan   Expected discharge: 2 - 3 days, recommended to prior living arrangement once antibiotic plan established, hemoglobin stable and mental status at baseline.  Entered: Marjorie Cruz MD 10/24/2019, 7:32 PM       The patient's care was discussed with the Bedside Nurse and Care Coordinator/.    Marjorie Cruz,  "MD  Hospitalist Service, Gold 9  [facility], [place]  Pager: [phone]  Please see sticky note for cross cover information  ______________________________________________________________________    Interval History   Up and about today Feels better post paracentesis.  He thought his diarrhea was improving and then he drank boost and it picked up again.  He does not want any more boost.  He would like to get disconnected from the IV for a while at least.  Mostly he is feeling cooped up.  Cough somewhat improved.       We discussed his alcohol use 2 shots of vodka with a beer some nights but not all. \"Occasionally\" and unable to give me further details.  He does not understand how poorly his liver is functioning now.      Data reviewed today: I reviewed all medications, new labs and imaging results over the last 24 hours. I personally reviewed the EKG tracing showing sinus tachycardia .    Physical Exam   Vital Signs: Temp: 98.5  F (36.9  C) Temp src: Oral BP: 136/80 Pulse: 99 Heart Rate: 120 Resp: 19 SpO2: 94 % O2 Device: None (Room air)    Weight: 185 lbs 3.2 oz  Constitutional: Awake, alert, walking around  Respiratory: CTAB, anteriorly   Cardiovascular: Tachycardic but regular, no edema   GI: Slightly distended, mild TTP, soft   Skin/Integumen: No rashes, no cyanosis    Neuro: oriented to person and place, and situation.       Data   Recent Labs   Lab 10/24/19  0533 10/23/19  0545 10/22/19  1921 10/22/19  0632  10/21/19  0617  10/20/19  1605   WBC 6.2 4.3 5.3 4.0   < > 5.6   < > 9.2   HGB 8.8* 8.9* 9.9* 9.2*   < > 8.7*   < > 11.2*   * 114* 114* 112*   < > 116*   < > 109*   * 96* 106* 94*   < > 81*   < > 124*   INR  --   --   --   --   --   --   --  1.37*    139  --  139   < >  --    < > 135   POTASSIUM 3.9 3.8  --  3.7   < >  --    < > 3.2*   CHLORIDE 107 107  --  106   < >  --    < > 98   CO2 25 25  --  25   < >  --    < > 21   BUN 1* 2*  --  2*   < >  --    < > 6*   CR " 0.37* 0.34*  --  0.41*   < >  --    < > 0.37*   ANIONGAP 6 7  --  7   < >  --    < > 17*   NATALIE 8.0* 7.8*  --  7.9*   < >  --    < > 8.2*   GLC 98 159*  --  181*   < >  --    < > 69*   ALBUMIN 2.2* 2.2*  --  2.4*   < > 2.2*  --  2.7*   PROTTOTAL 6.8 6.6*  --  6.9   < > 7.1  --  8.7   BILITOTAL 2.9* 2.4*  --  2.7*   < > 2.9*  --  3.8*   ALKPHOS 301* 277*  --  287*   < > 356*  --  448*   ALT 54 37  --  29   < > 33  --  45   * 128*  --  134*   < > 150*  --  185*   LIPASE  --   --   --   --   --   --   --  87   TROPI  --   --   --   --   --  <0.015  --   --     < > = values in this interval not displayed.

## 2019-10-26 LAB
BACTERIA SPEC CULT: NO GROWTH
BACTERIA SPEC CULT: NO GROWTH
Lab: NORMAL
Lab: NORMAL
SPECIMEN SOURCE: NORMAL
SPECIMEN SOURCE: NORMAL

## 2019-10-28 ENCOUNTER — PATIENT OUTREACH (OUTPATIENT)
Dept: CARE COORDINATION | Facility: CLINIC | Age: 54
End: 2019-10-28

## 2019-10-28 NOTE — PROGRESS NOTES
Lito who answered the phone states that Angel is in the hospital and not sure which one  But angel does not have a phone he just gives people this number to call    No further post discharge calls were made

## 2019-10-29 LAB
BACTERIA SPEC CULT: NO GROWTH
SPECIMEN SOURCE: NORMAL

## 2021-05-31 ENCOUNTER — RECORDS - HEALTHEAST (OUTPATIENT)
Dept: ADMINISTRATIVE | Facility: CLINIC | Age: 56
End: 2021-05-31

## 2021-06-01 ENCOUNTER — RECORDS - HEALTHEAST (OUTPATIENT)
Dept: ADMINISTRATIVE | Facility: CLINIC | Age: 56
End: 2021-06-01

## (undated) RX ORDER — LIDOCAINE HYDROCHLORIDE 10 MG/ML
INJECTION, SOLUTION EPIDURAL; INFILTRATION; INTRACAUDAL; PERINEURAL
Status: DISPENSED
Start: 2019-10-24

## (undated) RX ORDER — DIPHENHYDRAMINE HYDROCHLORIDE 50 MG/ML
INJECTION INTRAMUSCULAR; INTRAVENOUS
Status: DISPENSED
Start: 2019-10-21

## (undated) RX ORDER — FENTANYL CITRATE 50 UG/ML
INJECTION, SOLUTION INTRAMUSCULAR; INTRAVENOUS
Status: DISPENSED
Start: 2019-10-21